# Patient Record
Sex: FEMALE | Race: BLACK OR AFRICAN AMERICAN | NOT HISPANIC OR LATINO | Employment: FULL TIME | URBAN - METROPOLITAN AREA
[De-identification: names, ages, dates, MRNs, and addresses within clinical notes are randomized per-mention and may not be internally consistent; named-entity substitution may affect disease eponyms.]

---

## 2019-08-16 ENCOUNTER — HOSPITAL ENCOUNTER (INPATIENT)
Facility: HOSPITAL | Age: 24
LOS: 3 days | Discharge: HOME | DRG: 776 | End: 2019-08-19
Attending: EMERGENCY MEDICINE | Admitting: OBSTETRICS & GYNECOLOGY
Payer: MEDICAID

## 2019-08-16 DIAGNOSIS — Z79.4 TYPE 2 DIABETES MELLITUS WITH HYPERGLYCEMIA, WITH LONG-TERM CURRENT USE OF INSULIN: Primary | ICD-10-CM

## 2019-08-16 DIAGNOSIS — N72 CERVICITIS: ICD-10-CM

## 2019-08-16 DIAGNOSIS — E11.65 TYPE 2 DIABETES MELLITUS WITH HYPERGLYCEMIA, WITH LONG-TERM CURRENT USE OF INSULIN: Primary | ICD-10-CM

## 2019-08-16 DIAGNOSIS — O99.810 HYPERGLYCEMIA IN PREGNANCY: ICD-10-CM

## 2019-08-16 LAB
ALBUMIN SERPL-MCNC: 3.1 G/DL (ref 3.3–5.5)
ALP SERPL-CCNC: 70 U/L (ref 42–141)
B-HCG UR QL: POSITIVE
BACTERIA #/AREA URNS HPF: ABNORMAL /HPF
BILIRUB SERPL-MCNC: 0.4 MG/DL (ref 0.2–1.6)
BILIRUB UR QL STRIP: NEGATIVE
BILIRUBIN, POC UA: NEGATIVE
BLOOD, POC UA: NEGATIVE
BUN SERPL-MCNC: 6 MG/DL (ref 7–22)
CALCIUM SERPL-MCNC: 8.9 MG/DL (ref 8–10.3)
CHLORIDE SERPL-SCNC: 104 MMOL/L (ref 98–108)
CLARITY UR: ABNORMAL
CLARITY, POC UA: CLEAR
COLOR UR: YELLOW
COLOR, POC UA: YELLOW
CREAT SERPL-MCNC: 0.6 MG/DL (ref 0.6–1.2)
CTP QC/QA: YES
GLUCOSE SERPL-MCNC: 286 MG/DL (ref 73–118)
GLUCOSE UR QL STRIP: ABNORMAL
GLUCOSE, POC UA: ABNORMAL
HGB UR QL STRIP: NEGATIVE
KETONES UR QL STRIP: ABNORMAL
KETONES, POC UA: ABNORMAL
LEUKOCYTE EST, POC UA: NEGATIVE
LEUKOCYTE ESTERASE UR QL STRIP: ABNORMAL
MICROSCOPIC COMMENT: ABNORMAL
NITRITE UR QL STRIP: NEGATIVE
NITRITE, POC UA: NEGATIVE
PH UR STRIP: 6 [PH]
PH UR STRIP: 6 [PH] (ref 5–8)
POC ALT (SGPT): 17 U/L (ref 10–47)
POC AST (SGOT): 16 U/L (ref 11–38)
POC TCO2: 23 MMOL/L (ref 18–33)
POCT GLUCOSE: 198 MG/DL (ref 70–110)
POCT GLUCOSE: 221 MG/DL (ref 70–110)
POCT GLUCOSE: 294 MG/DL (ref 70–110)
POTASSIUM BLD-SCNC: 3.7 MMOL/L (ref 3.6–5.1)
PROT UR QL STRIP: NEGATIVE
PROTEIN, POC UA: NEGATIVE
PROTEIN, POC: 7 G/DL (ref 6.4–8.1)
SODIUM BLD-SCNC: 134 MMOL/L (ref 128–145)
SP GR UR STRIP: >1.03 (ref 1–1.03)
SPECIFIC GRAVITY, POC UA: 1.02
SQUAMOUS #/AREA URNS HPF: 10 /HPF
URN SPEC COLLECT METH UR: ABNORMAL
UROBILINOGEN UR STRIP-ACNC: NEGATIVE EU/DL
UROBILINOGEN, POC UA: 0.2 E.U./DL
WBC #/AREA URNS HPF: 10 /HPF (ref 0–5)
WBC CLUMPS URNS QL MICRO: ABNORMAL
YEAST URNS QL MICRO: ABNORMAL

## 2019-08-16 PROCEDURE — 96360 HYDRATION IV INFUSION INIT: CPT | Mod: ER

## 2019-08-16 PROCEDURE — 63600175 PHARM REV CODE 636 W HCPCS: Performed by: OBSTETRICS & GYNECOLOGY

## 2019-08-16 PROCEDURE — 85025 COMPLETE CBC W/AUTO DIFF WBC: CPT | Mod: ER

## 2019-08-16 PROCEDURE — 81025 URINE PREGNANCY TEST: CPT | Mod: ER | Performed by: EMERGENCY MEDICINE

## 2019-08-16 PROCEDURE — 87661 TRICHOMONAS VAGINALIS AMPLIF: CPT

## 2019-08-16 PROCEDURE — 81000 URINALYSIS NONAUTO W/SCOPE: CPT

## 2019-08-16 PROCEDURE — 11000001 HC ACUTE MED/SURG PRIVATE ROOM

## 2019-08-16 PROCEDURE — 99285 EMERGENCY DEPT VISIT HI MDM: CPT | Mod: 25,ER

## 2019-08-16 PROCEDURE — 96361 HYDRATE IV INFUSION ADD-ON: CPT

## 2019-08-16 PROCEDURE — 25000003 PHARM REV CODE 250: Mod: ER | Performed by: PHYSICIAN ASSISTANT

## 2019-08-16 PROCEDURE — 96372 THER/PROPH/DIAG INJ SC/IM: CPT | Mod: 59

## 2019-08-16 PROCEDURE — 87491 CHLMYD TRACH DNA AMP PROBE: CPT

## 2019-08-16 PROCEDURE — 80053 COMPREHEN METABOLIC PANEL: CPT | Mod: ER

## 2019-08-16 PROCEDURE — 63600175 PHARM REV CODE 636 W HCPCS: Mod: ER | Performed by: PHYSICIAN ASSISTANT

## 2019-08-16 PROCEDURE — G0378 HOSPITAL OBSERVATION PER HR: HCPCS

## 2019-08-16 PROCEDURE — 81003 URINALYSIS AUTO W/O SCOPE: CPT | Mod: ER

## 2019-08-16 PROCEDURE — 87481 CANDIDA DNA AMP PROBE: CPT | Mod: 59

## 2019-08-16 RX ORDER — ACETAMINOPHEN 500 MG
500 TABLET ORAL EVERY 6 HOURS PRN
Status: DISCONTINUED | OUTPATIENT
Start: 2019-08-16 | End: 2019-08-17

## 2019-08-16 RX ORDER — AZITHROMYCIN 250 MG/1
1000 TABLET, FILM COATED ORAL
Status: COMPLETED | OUTPATIENT
Start: 2019-08-16 | End: 2019-08-16

## 2019-08-16 RX ORDER — SODIUM CHLORIDE 9 MG/ML
20 INJECTION, SOLUTION INTRAVENOUS CONTINUOUS
Status: DISCONTINUED | OUTPATIENT
Start: 2019-08-16 | End: 2019-08-17

## 2019-08-16 RX ORDER — CEFTRIAXONE 250 MG/1
250 INJECTION, POWDER, FOR SOLUTION INTRAMUSCULAR; INTRAVENOUS
Status: COMPLETED | OUTPATIENT
Start: 2019-08-16 | End: 2019-08-16

## 2019-08-16 RX ORDER — ONDANSETRON 8 MG/1
8 TABLET, ORALLY DISINTEGRATING ORAL EVERY 8 HOURS PRN
Status: DISCONTINUED | OUTPATIENT
Start: 2019-08-16 | End: 2019-08-19 | Stop reason: HOSPADM

## 2019-08-16 RX ADMIN — AZITHROMYCIN MONOHYDRATE 1000 MG: 250 TABLET ORAL at 06:08

## 2019-08-16 RX ADMIN — SODIUM CHLORIDE 1000 ML: 0.9 INJECTION, SOLUTION INTRAVENOUS at 04:08

## 2019-08-16 RX ADMIN — SODIUM CHLORIDE 1000 ML: 0.9 INJECTION, SOLUTION INTRAVENOUS at 09:08

## 2019-08-16 RX ADMIN — CEFTRIAXONE SODIUM 250 MG: 250 INJECTION, POWDER, FOR SOLUTION INTRAMUSCULAR; INTRAVENOUS at 06:08

## 2019-08-16 NOTE — ED PROVIDER NOTES
Encounter Date: 2019    SCRIBE #1 NOTE: I, Melnaie Gomez , am scribing for, and in the presence of,  RISSA Linn. I have scribed the following portions of the note - Other sections scribed: HPI.       History     Chief Complaint   Patient presents with    Vaginal Discharge     reports greenish discharge, onset approx 2 weeks     CC: Vaginal Discharge    HPI:   Marissa Lewis is a 24 y.o.  female approximately 14 weeks gravid based on ultrasound confirming IUP by Dr. Ronni Martinez (OBGYN) who presents to the ED complaining of green vaginal discharge beginning 2 weeks ago. Patient notes dysuria that began this morning, but denies hematuria, abdominal, pelvic and vaginal pain, and vaginal bleeding. She also denies fever, any URI symptoms and N/V/D. She denies taking any medications for symptoms PTA. She reports she is currently having unprotected sex and is concerned about possible STI exposure. No known exposure. Pt has family history of DM in father. Unsure of age of diagnosis but reports likely in 30s. No prenatal labs have been drawn yet with her OBGYN. Pt has not seen primary care in some time, but denies any medical history.     The history is provided by the patient.     Review of patient's allergies indicates:  No Known Allergies  History reviewed. No pertinent past medical history.  History reviewed. No pertinent surgical history.  History reviewed. No pertinent family history.  Social History     Tobacco Use    Smoking status: Never Smoker   Substance Use Topics    Alcohol use: Never     Frequency: Never    Drug use: Not on file     Review of Systems   Constitutional: Negative for chills and fever.   HENT: Negative for congestion, ear pain, rhinorrhea and sore throat.    Eyes: Negative for redness.   Respiratory: Negative for stridor.    Gastrointestinal: Negative for abdominal pain, diarrhea, nausea and vomiting.   Genitourinary: Positive for dysuria and vaginal discharge. Negative  for frequency, hematuria, pelvic pain, urgency and vaginal bleeding.   Musculoskeletal: Negative for back pain and neck pain.   Neurological: Negative for speech difficulty.   Psychiatric/Behavioral: Negative for confusion.       Physical Exam     Initial Vitals [08/16/19 1529]   BP Pulse Resp Temp SpO2   137/86 108 19 99.2 °F (37.3 °C) 100 %      MAP       --         Physical Exam    Nursing note and vitals reviewed.  Constitutional: She appears well-developed and well-nourished.   HENT:   Head: Normocephalic.   Right Ear: External ear normal.   Left Ear: External ear normal.   Nose: Nose normal.   Mouth/Throat: Oropharynx is clear and moist.   Eyes: Conjunctivae are normal.   Cardiovascular: Regular rhythm. Tachycardia present.  Exam reveals no gallop and no friction rub.    No murmur heard.  Pulmonary/Chest: Breath sounds normal. She has no wheezes. She has no rhonchi. She has no rales.   Abdominal: Soft. Bowel sounds are normal. She exhibits no distension. There is no tenderness. There is no rebound, no guarding, no tenderness at McBurney's point and negative Bahena's sign.   Genitourinary:   Genitourinary Comments: Chaperoned by Denise Luther RN:  Scant amount of thin yellow discharge from cervical os with no cervical friability. Some thick white discharge in vaginal vault. No CMT or adnexal ttp or masses. No bleeding. Os closed.    Musculoskeletal: Normal range of motion.   Lymphadenopathy:     She has no cervical adenopathy.   Neurological: She is alert. She has normal strength. No cranial nerve deficit or sensory deficit.   Skin: Skin is warm and dry.   Psychiatric: She has a normal mood and affect.         ED Course   Procedures  Labs Reviewed   POCT URINE PREGNANCY - Abnormal; Notable for the following components:       Result Value    POC Preg Test, Ur Positive (*)     All other components within normal limits   POCT URINALYSIS W/O SCOPE - Abnormal; Notable for the following components:     Glucose, UA 3+ (*)     Bilirubin, UA Negative (*)     Ketones, UA Trace (*)     Blood, UA Negative (*)     Protein, UA Negative (*)     Nitrite, UA Negative (*)     Leukocytes, UA Negative (*)     All other components within normal limits   POCT GLUCOSE - Abnormal; Notable for the following components:    POCT Glucose 294 (*)     All other components within normal limits   POCT CMP - Abnormal; Notable for the following components:    POC BUN 6 (*)     POC Glucose 286 (*)     All other components within normal limits   POCT GLUCOSE - Abnormal; Notable for the following components:    POCT Glucose 221 (*)     All other components within normal limits   VAGINOSIS SCREEN BY DNA PROBE   C. TRACHOMATIS/N. GONORRHOEAE BY AMP DNA   CBC W/ AUTO DIFFERENTIAL   COMPREHENSIVE METABOLIC PANEL   URINALYSIS, REFLEX TO URINE CULTURE   POCT CBC   POCT GLUCOSE MONITORING CONTINUOUS   POCT CMP   POCT GLUCOSE MONITORING CONTINUOUS          Imaging Results    None          Medical Decision Making:   Initial Assessment:   24-year-old female  14 weeks gravid (OBGYN Dr. Martinez) presenting for evaluation of 2 history of agreed vaginal discharge and dysuria that began this morning as well as urinary frequency throughout pregnancy.  Patient is afebrile, mildly tachycardic in no distress. She denies any abdominal pain, pelvic pain, vaginal bleeding.  Exam above.  UA with 3+ glucose.  Glucose ordered and 294. Pt states she ate a snickers bar prior to arrival. IV saline given and glucose 286 on the CMP.  No anion gap.  Considered but doubt DKA.  Abdomen soft nontender. Doubt acute abdomen.  No evidence of PID no bleeding on pelvic exam. Repeat glucose 221    Spoke with Dr. Yesenia Bhat [facility] who recommends transfer to [facility] postpartum unit for observation and glucose control.   Patient prefers to go by private vehicle and will sign AMA form against EMS transport.     Discussed pt with Dr. Templeton who agrees with assessment  and plan.   Clinical Tests:   Lab Tests: Ordered and Reviewed            Scribe Attestation:   Scribe #1: I performed the above scribed service and the documentation accurately describes the services I performed. I attest to the accuracy of the note.    Attending Attestation:   Physician Attestation Statement for Resident:  As the supervising MD   Physician Attestation Statement: I have personally seen and examined this patient.   I agree with the above history. -:   As the supervising MD I agree with the above PE.   -: The patient was seen by a mid-level provider while I was on shift in the emergency department and I was available for consultation.  I did personally examine the patient and the following pertient physical exam finding were noted:  Skin warm pink and dry, Cardiac exam was regular rate and rhythm, Lung exam clear and unlabored, Abdomen soft and nontender and The patient is alert and oriented, respirations are unlabored, regular rate and rhythm and moving all extremities. I have reviewed the midlevel documentation, medical decision making, and treatment plan documented by the mid-level provider.     Pt alert, nontoxic  RRR  CTA  Soft NT  POC gluc now 221.               Physician Attestation for Scribe:  Physician Attestation Statement for Scribe #1: I, Misa Guardado PA-C, reviewed documentation, as scribed by Melanie Gomez in my presence, and it is both accurate and complete.                     Clinical Impression:     1. Hyperglycemia in pregnancy    2. Cervicitis                                   Misa Guardado PA-C  08/16/19 1818       Lesley Templeton MD  08/16/19 1821

## 2019-08-17 LAB
ALBUMIN SERPL BCP-MCNC: 3.2 G/DL (ref 3.5–5.2)
ALP SERPL-CCNC: 51 U/L (ref 55–135)
ALT SERPL W/O P-5'-P-CCNC: 11 U/L (ref 10–44)
ANION GAP SERPL CALC-SCNC: 6 MMOL/L (ref 8–16)
AST SERPL-CCNC: 7 U/L (ref 10–40)
BASOPHILS # BLD AUTO: 0.02 K/UL (ref 0–0.2)
BASOPHILS NFR BLD: 0.2 % (ref 0–1.9)
BILIRUB SERPL-MCNC: 0.2 MG/DL (ref 0.1–1)
BUN SERPL-MCNC: 8 MG/DL (ref 6–20)
CALCIUM SERPL-MCNC: 8.8 MG/DL (ref 8.7–10.5)
CHLORIDE SERPL-SCNC: 106 MMOL/L (ref 95–110)
CO2 SERPL-SCNC: 22 MMOL/L (ref 23–29)
CREAT SERPL-MCNC: 0.7 MG/DL (ref 0.5–1.4)
DIFFERENTIAL METHOD: NORMAL
EOSINOPHIL # BLD AUTO: 0.2 K/UL (ref 0–0.5)
EOSINOPHIL NFR BLD: 1.9 % (ref 0–8)
ERYTHROCYTE [DISTWIDTH] IN BLOOD BY AUTOMATED COUNT: 14.2 % (ref 11.5–14.5)
EST. GFR  (AFRICAN AMERICAN): >60 ML/MIN/1.73 M^2
EST. GFR  (NON AFRICAN AMERICAN): >60 ML/MIN/1.73 M^2
ESTIMATED AVG GLUCOSE: 226 MG/DL (ref 68–131)
GLUCOSE SERPL-MCNC: 157 MG/DL (ref 70–110)
HBA1C MFR BLD HPLC: 9.5 % (ref 4–5.6)
HCT VFR BLD AUTO: 37.2 % (ref 37–48.5)
HGB BLD-MCNC: 12 G/DL (ref 12–16)
LYMPHOCYTES # BLD AUTO: 3.4 K/UL (ref 1–4.8)
LYMPHOCYTES NFR BLD: 32.7 % (ref 18–48)
MCH RBC QN AUTO: 27.5 PG (ref 27–31)
MCHC RBC AUTO-ENTMCNC: 32.3 G/DL (ref 32–36)
MCV RBC AUTO: 85 FL (ref 82–98)
MONOCYTES # BLD AUTO: 0.6 K/UL (ref 0.3–1)
MONOCYTES NFR BLD: 5.4 % (ref 4–15)
NEUTROPHILS # BLD AUTO: 6.2 K/UL (ref 1.8–7.7)
NEUTROPHILS NFR BLD: 59.8 % (ref 38–73)
PLATELET # BLD AUTO: 227 K/UL (ref 150–350)
PMV BLD AUTO: 9.8 FL (ref 9.2–12.9)
POCT GLUCOSE: 126 MG/DL (ref 70–110)
POCT GLUCOSE: 139 MG/DL (ref 70–110)
POCT GLUCOSE: 145 MG/DL (ref 70–110)
POCT GLUCOSE: 165 MG/DL (ref 70–110)
POCT GLUCOSE: 189 MG/DL (ref 70–110)
POCT GLUCOSE: 220 MG/DL (ref 70–110)
POTASSIUM SERPL-SCNC: 3.7 MMOL/L (ref 3.5–5.1)
PROT SERPL-MCNC: 6.3 G/DL (ref 6–8.4)
RBC # BLD AUTO: 4.37 M/UL (ref 4–5.4)
SODIUM SERPL-SCNC: 134 MMOL/L (ref 136–145)
WBC # BLD AUTO: 10.47 K/UL (ref 3.9–12.7)

## 2019-08-17 PROCEDURE — 36415 COLL VENOUS BLD VENIPUNCTURE: CPT

## 2019-08-17 PROCEDURE — 83036 HEMOGLOBIN GLYCOSYLATED A1C: CPT

## 2019-08-17 PROCEDURE — 96372 THER/PROPH/DIAG INJ SC/IM: CPT

## 2019-08-17 PROCEDURE — 80053 COMPREHEN METABOLIC PANEL: CPT

## 2019-08-17 PROCEDURE — 25000003 PHARM REV CODE 250: Performed by: OBSTETRICS & GYNECOLOGY

## 2019-08-17 PROCEDURE — 96361 HYDRATE IV INFUSION ADD-ON: CPT

## 2019-08-17 PROCEDURE — 63600175 PHARM REV CODE 636 W HCPCS: Performed by: OBSTETRICS & GYNECOLOGY

## 2019-08-17 PROCEDURE — 85025 COMPLETE CBC W/AUTO DIFF WBC: CPT

## 2019-08-17 PROCEDURE — 63600175 PHARM REV CODE 636 W HCPCS: Performed by: HOSPITALIST

## 2019-08-17 PROCEDURE — 11000001 HC ACUTE MED/SURG PRIVATE ROOM

## 2019-08-17 RX ORDER — GLUCAGON 1 MG
1 KIT INJECTION
Status: DISCONTINUED | OUTPATIENT
Start: 2019-08-17 | End: 2019-08-19 | Stop reason: HOSPADM

## 2019-08-17 RX ORDER — ACETAMINOPHEN 500 MG
500 TABLET ORAL EVERY 6 HOURS PRN
Status: DISCONTINUED | OUTPATIENT
Start: 2019-08-17 | End: 2019-08-19 | Stop reason: HOSPADM

## 2019-08-17 RX ORDER — IBUPROFEN 200 MG
24 TABLET ORAL
Status: DISCONTINUED | OUTPATIENT
Start: 2019-08-17 | End: 2019-08-19 | Stop reason: HOSPADM

## 2019-08-17 RX ORDER — IBUPROFEN 200 MG
16 TABLET ORAL
Status: DISCONTINUED | OUTPATIENT
Start: 2019-08-17 | End: 2019-08-19 | Stop reason: HOSPADM

## 2019-08-17 RX ORDER — INSULIN ASPART 100 [IU]/ML
1-10 INJECTION, SOLUTION INTRAVENOUS; SUBCUTANEOUS
Status: DISCONTINUED | OUTPATIENT
Start: 2019-08-17 | End: 2019-08-19 | Stop reason: HOSPADM

## 2019-08-17 RX ADMIN — SODIUM CHLORIDE 1000 ML: 0.9 INJECTION, SOLUTION INTRAVENOUS at 04:08

## 2019-08-17 RX ADMIN — INSULIN ASPART 4 UNITS: 100 INJECTION, SOLUTION INTRAVENOUS; SUBCUTANEOUS at 02:08

## 2019-08-17 RX ADMIN — ACETAMINOPHEN 500 MG: 500 TABLET, FILM COATED ORAL at 01:08

## 2019-08-17 NOTE — PROGRESS NOTES
Insulin given in right arm per sliding scale. Patient instructed on use of insulin pen and injection techniques. Patient verbalizes understanding with good recall. FHT's detected in lower middle abdomen at 135-145 BPM.

## 2019-08-17 NOTE — CONSULTS
Food & Nutrition  Education    Diet Education: DM/Gestational DM and Nutrition  Time Spent: 15 min  Learners: Patient      Nutrition Education provided with handouts:   1. DM overview  2. Label reading tips      Comments: Pt receptive to education, reports being motivated to make changes. Encouraged limiting/eliminating simple sugars and limiting portion sizes of CHO rich foods to ~ 4 per meal, 1-2 per snack. Reviewed basics of label reading and guidelines per meal/snack. Discussed overall healthy eating during pregnancy and need for prenatal MVI and balanced diet with calcium rich foods included.  Provided f/u resources for outpatient f/u.       All questions and concerns answered. Dietitian's contact information provided.       Please Re-consult as needed        Thanks!  Caryn Beal RD

## 2019-08-17 NOTE — CONSULTS
[facility] Hospital Medicine  Consult Note    Patient Name: Marissa Lewis  MRN: [id number]  Admission Date: 8/16/2019  Hospital Length of Stay: 0 days  Attending Physician:Perlita  Primary Care Provider: To Obtain Unable           Patient information was obtained from patient and past medical records.     Consults  Subjective:     Principal Problem: <principal problem not specified>    Chief Complaint:   Chief Complaint   Patient presents with    Vaginal Discharge     reports greenish discharge, onset approx 2 weeks        HPI: See H&P    History reviewed. No pertinent past medical history.    History reviewed. No pertinent surgical history.    Review of patient's allergies indicates:  No Known Allergies    No current facility-administered medications on file prior to encounter.      No current outpatient medications on file prior to encounter.     Family History     None        Tobacco Use    Smoking status: Never Smoker   Substance and Sexual Activity    Alcohol use: Never     Frequency: Never    Drug use: Not on file    Sexual activity: Not on file     Review of Systems   Constitutional: Negative for activity change and appetite change.   HENT: Negative for congestion.    Eyes: Negative for itching.   Respiratory: Negative for apnea and chest tightness.    Cardiovascular: Negative for leg swelling.   Gastrointestinal: Negative for abdominal pain.   Endocrine: Negative for cold intolerance and heat intolerance.   Genitourinary: Negative for difficulty urinating and dysuria.   Musculoskeletal: Negative for arthralgias and back pain.   Skin: Negative for color change.   Allergic/Immunologic: Negative for environmental allergies and food allergies.   Neurological: Negative for headaches.   Hematological: Does not bruise/bleed easily.   Psychiatric/Behavioral: Negative for agitation and behavioral problems.     Objective:     Vital Signs (Most Recent):  Temp: 98.2 °F (36.8 °C) (08/17/19  1114)  Pulse: 87 (08/17/19 1114)  Resp: 20 (08/17/19 1114)  BP: (!) 109/55 (08/17/19 1114)  SpO2: 98 % (08/17/19 1114) Vital Signs (24h Range):  Temp:  [97.8 °F (36.6 °C)-99.5 °F (37.5 °C)] 98.2 °F (36.8 °C)  Pulse:  [] 87  Resp:  [16-20] 20  SpO2:  [97 %-100 %] 98 %  BP: (104-137)/(55-92) 109/55     Weight: 119.3 kg (263 lb)  Body mass index is 41.19 kg/m².    Physical Exam   Constitutional: She is oriented to person, place, and time. No distress.   Eyes: EOM are normal.   Neck: Neck supple.   Cardiovascular: Normal rate and regular rhythm.   Pulmonary/Chest: Effort normal.   Abdominal: Soft. Bowel sounds are normal.   Musculoskeletal: Normal range of motion. She exhibits no edema.   Neurological: She is oriented to person, place, and time. Coordination normal.   Skin: Skin is warm.   Psychiatric: She has a normal mood and affect. Thought content normal.       Significant Labs:   BMP:   Recent Labs   Lab 08/17/19  0513   *   *   K 3.7      CO2 22*   BUN 8   CREATININE 0.7   CALCIUM 8.8     CBC:   Recent Labs   Lab 08/17/19  0513   WBC 10.47   HGB 12.0   HCT 37.2              Assessment/Plan:     Hyperglycemia in pregnancy  Hospital medicice has been consulted for DM management for pregnant patient,fasting sugar today is only 157 in AM labs,,started on SSI,HbA1C is pending,        VTE Risk Mitigation (From admission, onward)    None              Thank you for your consult. I will follow-up with patient. Please contact us if you have any additional questions.    Geri Trevizo MD  Department of Hospital Medicine   [facility]

## 2019-08-17 NOTE — ASSESSMENT & PLAN NOTE
Hospital medicice has been consulted for DM management for pregnant patient,fasting sugar today is only 157 in AM labs,,started on SSI,HbA1C is pending,

## 2019-08-17 NOTE — H&P
`History and Physical  Obstetrics      SUBJECTIVE:     Marissa Lewis is a 24 y.o.  female at 14w4d  admitted for diabetic management.  Her current obstetrical history is significant for dm.      No medications prior to admission.       Review of patient's allergies indicates:  No Known Allergies     History reviewed. No pertinent past medical history.  History reviewed. No pertinent surgical history.  History reviewed. No pertinent family history.  Social History     Tobacco Use    Smoking status: Never Smoker   Substance Use Topics    Alcohol use: Never     Frequency: Never    Drug use: Not on file        OBJECTIVE:     Vital Signs (Most Recent)  Temp: 98.6 °F (37 °C) (19)  Pulse: 87 (19)  Resp: 20 (19)  BP: (!) 104/59 (19)  SpO2: 98 % (19)    Physical Exam:  General:  Normal, alert and oriented                       Abdomen: Soft gravid no FT   Uterine Size:  c/w dates       FHT: reviewed   Pelvis: NEFG                                  Laboratory:  No results for input(s): ABORH, HEPBSAG, RUBELLAIGGSC, GBS, AFP, BGECPZC4BI in the last 168 hours.   ASSESSMENT/PLAN:     14w4d gestation.   Conditions: Diabetes: Pre-gestational   Will consult hospitalist to start insulin dosing

## 2019-08-17 NOTE — PLAN OF CARE
Problem: Adult Inpatient Plan of Care  Goal: Patient-Specific Goal (Individualization)  Outcome: Ongoing (interventions implemented as appropriate)  VSS. Denies any pain. No vaginal bleeding. Glucose monitoring per order. LH IV patent and infusing with NS at 125ml/hr. POC reviewed and understanding noted. ALIS

## 2019-08-17 NOTE — HOSPITAL COURSE
Hospital medicice has been consulted for DM management for pregnant patient,fasting sugar today is only 157,started on SSI,HbA1C is 9.5,started on SSI.blood sugar is stable.  Patient can go home with insulin,I think choice of insulin in pregnancy is NPH insulin,may 5 bid with close monitoring and diet.  Will sign off.

## 2019-08-17 NOTE — PLAN OF CARE
"Problem: Adult Inpatient Plan of Care  Goal: Plan of Care Review  Outcome: Ongoing (interventions implemented as appropriate)  Patient in stable condition. Sliding scale initiated and patient instructed on pen usage and injection technique. Reinforced teaching and literature given on compliance with diabetic diet. Patient verbalizes understanding of all instructions with good recall. FHT"S auscultated via doppler, rate WNL.       "

## 2019-08-17 NOTE — SUBJECTIVE & OBJECTIVE
History reviewed. No pertinent past medical history.    History reviewed. No pertinent surgical history.    Review of patient's allergies indicates:  No Known Allergies    No current facility-administered medications on file prior to encounter.      No current outpatient medications on file prior to encounter.     Family History     None        Tobacco Use    Smoking status: Never Smoker   Substance and Sexual Activity    Alcohol use: Never     Frequency: Never    Drug use: Not on file    Sexual activity: Not on file     Review of Systems   Constitutional: Negative for activity change and appetite change.   HENT: Negative for congestion.    Eyes: Negative for itching.   Respiratory: Negative for apnea and chest tightness.    Cardiovascular: Negative for leg swelling.   Gastrointestinal: Negative for abdominal pain.   Endocrine: Negative for cold intolerance and heat intolerance.   Genitourinary: Negative for difficulty urinating and dysuria.   Musculoskeletal: Negative for arthralgias and back pain.   Skin: Negative for color change.   Allergic/Immunologic: Negative for environmental allergies and food allergies.   Neurological: Negative for headaches.   Hematological: Does not bruise/bleed easily.   Psychiatric/Behavioral: Negative for agitation and behavioral problems.     Objective:     Vital Signs (Most Recent):  Temp: 98.2 °F (36.8 °C) (08/17/19 1114)  Pulse: 87 (08/17/19 1114)  Resp: 20 (08/17/19 1114)  BP: (!) 109/55 (08/17/19 1114)  SpO2: 98 % (08/17/19 1114) Vital Signs (24h Range):  Temp:  [97.8 °F (36.6 °C)-99.5 °F (37.5 °C)] 98.2 °F (36.8 °C)  Pulse:  [] 87  Resp:  [16-20] 20  SpO2:  [97 %-100 %] 98 %  BP: (104-137)/(55-92) 109/55     Weight: 119.3 kg (263 lb)  Body mass index is 41.19 kg/m².    Physical Exam   Constitutional: She is oriented to person, place, and time. No distress.   Eyes: EOM are normal.   Neck: Neck supple.   Cardiovascular: Normal rate and regular rhythm.   Pulmonary/Chest:  Effort normal.   Abdominal: Soft. Bowel sounds are normal.   Musculoskeletal: Normal range of motion. She exhibits no edema.   Neurological: She is oriented to person, place, and time. Coordination normal.   Skin: Skin is warm.   Psychiatric: She has a normal mood and affect. Thought content normal.       Significant Labs:   BMP:   Recent Labs   Lab 08/17/19 0513   *   *   K 3.7      CO2 22*   BUN 8   CREATININE 0.7   CALCIUM 8.8     CBC:   Recent Labs   Lab 08/17/19 0513   WBC 10.47   HGB 12.0   HCT 37.2

## 2019-08-18 PROBLEM — E11.65 TYPE 2 DIABETES MELLITUS WITH HYPERGLYCEMIA: Status: ACTIVE | Noted: 2019-08-18

## 2019-08-18 LAB
POCT GLUCOSE: 115 MG/DL (ref 70–110)
POCT GLUCOSE: 123 MG/DL (ref 70–110)
POCT GLUCOSE: 141 MG/DL (ref 70–110)
POCT GLUCOSE: 171 MG/DL (ref 70–110)

## 2019-08-18 PROCEDURE — 11000001 HC ACUTE MED/SURG PRIVATE ROOM

## 2019-08-18 PROCEDURE — 63600175 PHARM REV CODE 636 W HCPCS: Performed by: OBSTETRICS & GYNECOLOGY

## 2019-08-18 RX ADMIN — INSULIN ASPART 2 UNITS: 100 INJECTION, SOLUTION INTRAVENOUS; SUBCUTANEOUS at 12:08

## 2019-08-18 RX ADMIN — HUMAN INSULIN 5 UNITS: 100 INJECTION, SUSPENSION SUBCUTANEOUS at 05:08

## 2019-08-18 NOTE — SUBJECTIVE & OBJECTIVE
History reviewed. No pertinent past medical history.    History reviewed. No pertinent surgical history.    Review of patient's allergies indicates:  No Known Allergies    No current facility-administered medications on file prior to encounter.      No current outpatient medications on file prior to encounter.     Family History     None        Tobacco Use    Smoking status: Never Smoker   Substance and Sexual Activity    Alcohol use: Never     Frequency: Never    Drug use: Not on file    Sexual activity: Not on file     Review of Systems   Constitutional: Negative for activity change and appetite change.   HENT: Negative for congestion.    Eyes: Negative for itching.   Respiratory: Negative for apnea and chest tightness.    Cardiovascular: Negative for leg swelling.   Gastrointestinal: Negative for abdominal pain.   Endocrine: Negative for cold intolerance and heat intolerance.   Genitourinary: Negative for difficulty urinating and dysuria.   Musculoskeletal: Negative for arthralgias and back pain.   Skin: Negative for color change.   Allergic/Immunologic: Negative for environmental allergies and food allergies.   Neurological: Negative for headaches.   Hematological: Does not bruise/bleed easily.   Psychiatric/Behavioral: Negative for agitation and behavioral problems.     Objective:     Vital Signs (Most Recent):  Temp: 98 °F (36.7 °C) (08/18/19 0800)  Pulse: 88 (08/18/19 0800)  Resp: 18 (08/18/19 0800)  BP: (!) 105/55 (08/18/19 0800)  SpO2: 97 % (08/17/19 1609) Vital Signs (24h Range):  Temp:  [96.7 °F (35.9 °C)-98.9 °F (37.2 °C)] 98 °F (36.7 °C)  Pulse:  [79-90] 88  Resp:  [16-20] 18  SpO2:  [97 %-98 %] 97 %  BP: (105-122)/(55-72) 105/55     Weight: 119.3 kg (263 lb)  Body mass index is 41.19 kg/m².    Physical Exam   Constitutional: She is oriented to person, place, and time. No distress.   Eyes: EOM are normal.   Neck: Neck supple.   Cardiovascular: Normal rate and regular rhythm.   Pulmonary/Chest:  Effort normal.   Abdominal: Soft. Bowel sounds are normal.   Musculoskeletal: Normal range of motion. She exhibits no edema.   Neurological: She is oriented to person, place, and time. Coordination normal.   Skin: Skin is warm.   Psychiatric: She has a normal mood and affect. Thought content normal.       Significant Labs:   BMP:   Recent Labs   Lab 08/17/19 0513   *   *   K 3.7      CO2 22*   BUN 8   CREATININE 0.7   CALCIUM 8.8     CBC:   Recent Labs   Lab 08/17/19 0513   WBC 10.47   HGB 12.0   HCT 37.2

## 2019-08-18 NOTE — ASSESSMENT & PLAN NOTE
Hospital medicice has been consulted for DM management for pregnant patient,fasting sugar today is only 115  in AM labs,,started on SSI,HbA1C 9.5.

## 2019-08-18 NOTE — PROGRESS NOTES
[facility]  Obstetrics & Gynecology  Progress Note    Patient Name: Marissa Lewis  MRN: [id number]  Admission Date: 8/16/2019  Primary Care Provider: To Obtain Unable  Principal Problem: Hyperglycemia in pregnancy    Subjective:     Interval History: 25 y/o female with pregestational DM.  No c/o    Scheduled Meds:   insulin NPH  5 Units Subcutaneous BID AC     Continuous Infusions:  PRN Meds:acetaminophen, dextrose 50%, dextrose 50%, glucagon (human recombinant), glucose, glucose, insulin aspart U-100, ondansetron, promethazine (PHENERGAN) IVPB    Review of patient's allergies indicates:  No Known Allergies    Objective:     Vital Signs (Most Recent):  Temp: 98.5 °F (36.9 °C) (08/18/19 1200)  Pulse: 85 (08/18/19 1200)  Resp: 18 (08/18/19 1200)  BP: (!) 105/58 (08/18/19 1200)  SpO2: 97 % (08/17/19 1609) Vital Signs (24h Range):  Temp:  [96.7 °F (35.9 °C)-98.9 °F (37.2 °C)] 98.5 °F (36.9 °C)  Pulse:  [79-90] 85  Resp:  [16-18] 18  SpO2:  [97 %] 97 %  BP: (105-122)/(55-72) 105/58     Weight: 119.3 kg (263 lb)  Body mass index is 41.19 kg/m².  No LMP recorded (approximate). Patient is pregnant.    I&O (Last 24H):    Intake/Output Summary (Last 24 hours) at 8/18/2019 1333  Last data filed at 8/18/2019 0439  Gross per 24 hour   Intake 1200 ml   Output 300 ml   Net 900 ml       Physical Exam   Gen A&O x 4 NAD  Abd soft nt  FHT documented    Laboratory:  CBC:   Recent Labs   Lab 08/17/19  0513   WBC 10.47   RBC 4.37   HGB 12.0   HCT 37.2      MCV 85   MCH 27.5   MCHC 32.3     CMP:   Recent Labs   Lab 08/17/19  0513   *   CALCIUM 8.8   ALBUMIN 3.2*   PROT 6.3   *   K 3.7   CO2 22*      BUN 8   CREATININE 0.7   ALKPHOS 51*   ALT 11   AST 7*   BILITOT 0.2       Diagnostic Results:  none    Assessment/Plan:     Active Diagnoses:    Diagnosis Date Noted POA    PRINCIPAL PROBLEM:  Hyperglycemia in pregnancy [O99.810] 08/16/2019 Yes    Type 2 diabetes mellitus with hyperglycemia  [E11.65] 08/18/2019 Yes      Problems Resolved During this Admission:       Pregestational diabetes  Start NPH 5 units BID  Appreciate hospitalist medicine    Yesenia Art MD  Obstetrics & Gynecology  [facility]

## 2019-08-18 NOTE — PLAN OF CARE
Problem: Adult Inpatient Plan of Care  Goal: Patient-Specific Goal (Individualization)  Outcome: Ongoing (interventions implemented as appropriate)  VSS. No pain. Glucose checks completed as ordered. Patient without complaints. FHTs WNL. POC discussed and understanding voiced. CECILIA

## 2019-08-18 NOTE — PROGRESS NOTES
[facility] Hospital Medicine  Progress Note    Patient Name: Marissa Lewis  MRN: [id number]  Patient Class: IP- Inpatient   Admission Date: 8/16/2019  Length of Stay: 1 days  Attending Physician: Yesenia Bhat*  Primary Care Provider: To Obtain Unable        Subjective:     Principal Problem:<principal problem not specified>        HPI:  See H&P    Overview/Hospital Course:  Hospital medicice has been consulted for DM management for pregnant patient,fasting sugar today is only 157,started on SSI,HbA1C is 9.5,started on SSI.blood sugar is stable.  Patient can go home with insulin,I think choice of insulin in pregnancy is NPH insulin,may 5 bid with close monitoring and diet.  Will sign off.    History reviewed. No pertinent past medical history.    History reviewed. No pertinent surgical history.    Review of patient's allergies indicates:  No Known Allergies    No current facility-administered medications on file prior to encounter.      No current outpatient medications on file prior to encounter.     Family History     None        Tobacco Use    Smoking status: Never Smoker   Substance and Sexual Activity    Alcohol use: Never     Frequency: Never    Drug use: Not on file    Sexual activity: Not on file     Review of Systems   Constitutional: Negative for activity change and appetite change.   HENT: Negative for congestion.    Eyes: Negative for itching.   Respiratory: Negative for apnea and chest tightness.    Cardiovascular: Negative for leg swelling.   Gastrointestinal: Negative for abdominal pain.   Endocrine: Negative for cold intolerance and heat intolerance.   Genitourinary: Negative for difficulty urinating and dysuria.   Musculoskeletal: Negative for arthralgias and back pain.   Skin: Negative for color change.   Allergic/Immunologic: Negative for environmental allergies and food allergies.   Neurological: Negative for headaches.   Hematological: Does not bruise/bleed  easily.   Psychiatric/Behavioral: Negative for agitation and behavioral problems.     Objective:     Vital Signs (Most Recent):  Temp: 98 °F (36.7 °C) (08/18/19 0800)  Pulse: 88 (08/18/19 0800)  Resp: 18 (08/18/19 0800)  BP: (!) 105/55 (08/18/19 0800)  SpO2: 97 % (08/17/19 1609) Vital Signs (24h Range):  Temp:  [96.7 °F (35.9 °C)-98.9 °F (37.2 °C)] 98 °F (36.7 °C)  Pulse:  [79-90] 88  Resp:  [16-20] 18  SpO2:  [97 %-98 %] 97 %  BP: (105-122)/(55-72) 105/55     Weight: 119.3 kg (263 lb)  Body mass index is 41.19 kg/m².    Physical Exam   Constitutional: She is oriented to person, place, and time. No distress.   Eyes: EOM are normal.   Neck: Neck supple.   Cardiovascular: Normal rate and regular rhythm.   Pulmonary/Chest: Effort normal.   Abdominal: Soft. Bowel sounds are normal.   Musculoskeletal: Normal range of motion. She exhibits no edema.   Neurological: She is oriented to person, place, and time. Coordination normal.   Skin: Skin is warm.   Psychiatric: She has a normal mood and affect. Thought content normal.       Significant Labs:   BMP:   Recent Labs   Lab 08/17/19  0513   *   *   K 3.7      CO2 22*   BUN 8   CREATININE 0.7   CALCIUM 8.8     CBC:   Recent Labs   Lab 08/17/19  0513   WBC 10.47   HGB 12.0   HCT 37.2                Assessment/Plan:      Type 2 diabetes mellitus with hyperglycemia  HbA1C is 9.5,started on SSI.blood sugar is stable.  Patient can go home with insulin,I think choice of insulin in pregnancy is NPH insulin,may 5 bid with close monitoring and diet.  Will sign off.      Hyperglycemia in pregnancy  Hospital medicice has been consulted for DM management for pregnant patient,fasting sugar today is only 115  in AM labs,,started on SSI,HbA1C 9.5.        VTE Risk Mitigation (From admission, onward)    None                Geri Trevizo MD  Department of Hospital Medicine   [facility]

## 2019-08-18 NOTE — PLAN OF CARE
Problem: Diabetes in Pregnancy  Goal: Blood Glucose Level Within Desired Range  Outcome: Ongoing (interventions implemented as appropriate)  Glucose monitoring as ordered.

## 2019-08-18 NOTE — ASSESSMENT & PLAN NOTE
HbA1C is 9.5,started on SSI.blood sugar is stable.  Patient can go home with insulin,I think choice of insulin in pregnancy is NPH insulin,may 5 bid with close monitoring and diet.  Will sign off.

## 2019-08-18 NOTE — PLAN OF CARE
Problem: Adult Inpatient Plan of Care  Goal: Plan of Care Review  Outcome: Ongoing (interventions implemented as appropriate)  POC discussed. Reviewed parameters reviewed. Encouraged ambulation in [place]iley. Understanding voiced. Whiteboard updated with Domo. CECILIA

## 2019-08-19 VITALS
OXYGEN SATURATION: 98 % | HEART RATE: 89 BPM | DIASTOLIC BLOOD PRESSURE: 57 MMHG | RESPIRATION RATE: 20 BRPM | TEMPERATURE: 98 F | HEIGHT: 67 IN | BODY MASS INDEX: 41.28 KG/M2 | WEIGHT: 263 LBS | SYSTOLIC BLOOD PRESSURE: 110 MMHG

## 2019-08-19 LAB
BACTERIAL VAGINOSIS DNA: NEGATIVE
C TRACH DNA SPEC QL NAA+PROBE: NOT DETECTED
CANDIDA GLABRATA DNA: NEGATIVE
CANDIDA KRUSEI DNA: NEGATIVE
CANDIDA RRNA VAG QL PROBE: POSITIVE
N GONORRHOEA DNA SPEC QL NAA+PROBE: NOT DETECTED
POCT GLUCOSE: 105 MG/DL (ref 70–110)
POCT GLUCOSE: 144 MG/DL (ref 70–110)
POCT GLUCOSE: 200 MG/DL (ref 70–110)
T VAGINALIS RRNA GENITAL QL PROBE: NEGATIVE

## 2019-08-19 RX ORDER — LANCETS
1 EACH MISCELLANEOUS 4 TIMES DAILY
Qty: 150 EACH | Refills: 3 | Status: SHIPPED | OUTPATIENT
Start: 2019-08-19 | End: 2019-08-29

## 2019-08-19 RX ORDER — INSULIN PUMP SYRINGE, 3 ML
EACH MISCELLANEOUS
Qty: 1 EACH | Refills: 0 | Status: SHIPPED | OUTPATIENT
Start: 2019-08-19 | End: 2019-08-29

## 2019-08-19 RX ADMIN — HUMAN INSULIN 5 UNITS: 100 INJECTION, SUSPENSION SUBCUTANEOUS at 08:08

## 2019-08-19 NOTE — PROGRESS NOTES
[facility]  Obstetrics & Gynecology  Progress Note    Patient Name: Marissa Lewis  MRN: [id number]  Admission Date: 8/16/2019  Primary Care Provider: To Obtain Unable  Principal Problem: Hyperglycemia in pregnancy    Subjective:     Interval History: 23 y/o admitted for diabetic patterning.  Patient with pre gestational DM.  Is near optimized with 5 U NPH qam and qhs.  Will increase to 8 units qam.  Patient is invited to follow up at [facility] for OB care, if she wishes.      Scheduled Meds:   insulin NPH  5 Units Subcutaneous BID AC     Continuous Infusions:  PRN Meds:acetaminophen, dextrose 50%, dextrose 50%, glucagon (human recombinant), glucose, glucose, insulin aspart U-100, ondansetron, promethazine (PHENERGAN) IVPB    Review of patient's allergies indicates:  No Known Allergies    Objective:     Vital Signs (Most Recent):  Temp: 98.4 °F (36.9 °C) (08/19/19 1105)  Pulse: 89 (08/19/19 1105)  Resp: 20 (08/19/19 1105)  BP: (!) 110/57 (08/19/19 1105)  SpO2: 98 % (08/19/19 1105) Vital Signs (24h Range):  Temp:  [98 °F (36.7 °C)-99.4 °F (37.4 °C)] 98.4 °F (36.9 °C)  Pulse:  [79-92] 89  Resp:  [17-20] 20  SpO2:  [97 %-98 %] 98 %  BP: (101-113)/(54-64) 110/57     Weight: 119.3 kg (263 lb)  Body mass index is 41.19 kg/m².  No LMP recorded (approximate). Patient is pregnant.    I&O (Last 24H):    Intake/Output Summary (Last 24 hours) at 8/19/2019 1159  Last data filed at 8/19/2019 0412  Gross per 24 hour   Intake 960 ml   Output 300 ml   Net 660 ml       Physical Exam   Gen Alert and oriented  CV RRR  Lungs CTA B  Ab soft nt  Ext no cce    Laboratory:  I have personallly reviewed all pertinent lab results from the last 24 hours.    Diagnostic Results:  Labs: Reviewed       Assessment/Plan:     Active Diagnoses:    Diagnosis Date Noted POA    PRINCIPAL PROBLEM:  Hyperglycemia in pregnancy [O99.810] 08/16/2019 Yes    Type 2 diabetes mellitus with hyperglycemia [E11.65] 08/18/2019 Yes      Problems  Resolved During this Admission:       Will discharge with NPH insulin 8 unit qam 8 units qhs    Follow up at [facility]    Micky Zhu MD  Obstetrics & Gynecology  [facility]

## 2019-08-19 NOTE — DISCHARGE INSTRUCTIONS
General Discharge Instructions  · May follow a regular diet, unless otherwise discussed with physician.  · Activity as tolerated.  · No lifting or heavy exercise  · May return to work/school as discussed with physician  · Follow MD specific instructions, follow up as needed    Report to MD:  · Labor pains that are 5-10 minutes apart and regular  · Water bag break or possible leaking  Do kick counts once a day on your baby. Choose the time of day your baby is most active. Get in a comfortable lying or sitting position and time how long it takes to feel 10 kicks, twists, turns, swishes, or rolls. Report diminished fetal movement of less than 10 movements in a 12 hour period

## 2019-08-20 NOTE — DISCHARGE SUMMARY
DATE OF ADMISSION:  08/16/2019    DATE OF DISCHARGE:  08/19/2019    ADMIT DIAGNOSES:  1.  Intrauterine pregnancy at 14 weeks' gestation.  2.  Pregestational diabetes, uncontrolled.    DISCHARGE DIAGNOSES:  1.  Intrauterine pregnancy at 14 weeks' gestation.  2.  Pregestational diabetes, uncontrolled.    HOSPITAL COURSE:  Ms. Lewis is a 24-year-old -American female who is   approximately 14 weeks' gestation, who was admitted to our service for blood   sugar patterning.  This patient has pregestational diabetes with very poor   control.  She was placed on a weight-based protocol and would ultimately require   5 units of NPH in the evenings and 8 units of NPH in the morning.  She   responded to this regimen fairly well and on the morning of 08/19/2019, she was   within acceptable range to be discharged home.  She was discharged home with a   prescription for insulin, a glucometer, test strips, and lancets.  She will   follow up with her OB/GYN next week.  She was discharged home with instructions   to follow a diabetic diet and to return to the Emergency Department for any   problems.  She was instructed to have normal activity and then to return to the   Emergency Department if she has any problems.      FROYLAN/AV  dd: 08/19/2019 12:10:30 (CDT)  td: 08/20/2019 02:21:41 (CDT)  Doc ID   #[id number]  Job ID #[id number]    CC:

## 2019-08-29 ENCOUNTER — OFFICE VISIT (OUTPATIENT)
Dept: OBSTETRICS AND GYNECOLOGY | Facility: CLINIC | Age: 24
End: 2019-08-29
Payer: MEDICAID

## 2019-08-29 VITALS
SYSTOLIC BLOOD PRESSURE: 116 MMHG | DIASTOLIC BLOOD PRESSURE: 74 MMHG | BODY MASS INDEX: 41.55 KG/M2 | WEIGHT: 264.75 LBS | HEIGHT: 67 IN

## 2019-08-29 DIAGNOSIS — O99.210 MATERNAL OBESITY AFFECTING PREGNANCY, ANTEPARTUM: ICD-10-CM

## 2019-08-29 DIAGNOSIS — Z32.00 ENCOUNTER FOR CONFIRMATION OF PREGNANCY TEST RESULT WITH PHYSICAL EXAMINATION: Primary | ICD-10-CM

## 2019-08-29 DIAGNOSIS — O24.319 PREEXISTING DIABETES COMPLICATING PREGNANCY, ANTEPARTUM: ICD-10-CM

## 2019-08-29 DIAGNOSIS — Z36.89 ENCOUNTER TO ESTABLISH GESTATIONAL AGE USING ULTRASOUND: ICD-10-CM

## 2019-08-29 PROCEDURE — 99203 PR OFFICE/OUTPT VISIT, NEW, LEVL III, 30-44 MIN: ICD-10-PCS | Mod: S$PBB,TH,, | Performed by: OBSTETRICS & GYNECOLOGY

## 2019-08-29 PROCEDURE — 99999 PR PBB SHADOW E&M-EST. PATIENT-LVL III: CPT | Mod: PBBFAC,,, | Performed by: OBSTETRICS & GYNECOLOGY

## 2019-08-29 PROCEDURE — 99203 OFFICE O/P NEW LOW 30 MIN: CPT | Mod: S$PBB,TH,, | Performed by: OBSTETRICS & GYNECOLOGY

## 2019-08-29 PROCEDURE — 99213 OFFICE O/P EST LOW 20 MIN: CPT | Mod: PBBFAC,TH | Performed by: OBSTETRICS & GYNECOLOGY

## 2019-08-29 PROCEDURE — 99999 PR PBB SHADOW E&M-EST. PATIENT-LVL III: ICD-10-PCS | Mod: PBBFAC,,, | Performed by: OBSTETRICS & GYNECOLOGY

## 2019-08-29 RX ORDER — INSULIN PUMP SYRINGE, 3 ML
EACH MISCELLANEOUS
Qty: 1 EACH | Refills: 0 | Status: SHIPPED | OUTPATIENT
Start: 2019-08-29

## 2019-08-29 RX ORDER — BLOOD-GLUCOSE CONTROL, NORMAL
1 EACH MISCELLANEOUS 4 TIMES DAILY PRN
Qty: 100 EACH | Refills: 11 | Status: SHIPPED | OUTPATIENT
Start: 2019-08-29 | End: 2020-08-28

## 2019-08-29 RX ORDER — SYRINGE,SAFETY WITH NEEDLE,1ML 25GX1"
1 SYRINGE (EA) MISCELLANEOUS
Qty: 100 EACH | Refills: 5 | Status: SHIPPED | OUTPATIENT
Start: 2019-08-29

## 2019-08-29 RX ORDER — INSULIN ASPART 100 [IU]/ML
10 INJECTION, SOLUTION INTRAVENOUS; SUBCUTANEOUS
Qty: 3 ML | Refills: 3 | Status: SHIPPED | OUTPATIENT
Start: 2019-08-29

## 2019-08-29 RX ORDER — FOLIC ACID 1 MG/1
4 TABLET ORAL DAILY
Qty: 120 TABLET | Refills: 11 | Status: SHIPPED | OUTPATIENT
Start: 2019-08-29 | End: 2020-08-28

## 2019-08-30 ENCOUNTER — TELEPHONE (OUTPATIENT)
Dept: MATERNAL FETAL MEDICINE | Facility: CLINIC | Age: 24
End: 2019-08-30

## 2019-08-30 NOTE — TELEPHONE ENCOUNTER
I called Marissa to get her scheduled for an ultrasound and her phone went straight to voicemail. I left her a message with the phone number to call us back. The second number on file rang but did not have a voicemail option and the pt does not have the portal to get a message there.     Caryn

## 2019-08-31 NOTE — PROGRESS NOTES
[facility]  Ambulatory Clinic  Obstetrics & Gynecology    Visit Date:  2019     Chief Complaint:  Missed my period    History of Present Illness:      Marissa Lewis is a 24 y.o. , UPT positive today, here with c/o missed period.    Patient's last menstrual period was 2019.      Pt has no major complaints today and denies any vaginal bleeding, discharge, pain, GI/ compliants.      Medical history is remarkable for DM, recently dx, admitted on  for glucose control and initiation of insulin therapy.    Pt states she did not  her glucose testing supplies or insulin since discharge from hospital.    Pt reports overall good health.    Past Medical History:      Diabetes on insuilin   Obesity     Past Surgical History:     History reviewed. No pertinent surgical history.     Medications:     Prenatal vitamins   Insulin     Allergies:      NKDA      Obstetric History:      G1, current    Gynecologic History:      Denies recent/active STI  Denies history abnormal Pap     Social History:      Denies tobacco, alcohol or illicit drug use  Current partner is father of baby  Denies domestic abuse     Family History:      Denies congenital anomalies, inherited syndromes, fetal aneuploidy    Review of Systems:      Constitutional:  No fever, fatigue  HENT:  No congestion, hearing changes  Eyes:  No visual disturbance  Respiratory:  No cough, shortness of breath  Cardiovascular:  No chest pain, leg swelling  Breast:  No lump, pain, nipple discharge, redness, skin changes  Gastrointestinal:  No abdominal pain, constipation, blood in stool   Genitourinary:  No dysuria, frequency  Endocrine:  No heat or cold intolerance  Musculoskeletal:  No back pain, arthralgias  Skin:  No rash, jaundice  Neurological:  No dizziness, weakness, headaches  Psychiatric/Behavioral:  No sleep disturbance, dysphoric mood     Physical Exam:     /74 (BP Location: Right arm, Patient Position:  "Sitting)   Ht 5' 7" (1.702 m)   Wt 120.1 kg (264 lb 12.4 oz)   LMP 2019 (Exact Date)   BMI 41.47 kg/m²      GENERAL:  NAD. Well-nourished. A&Ox3.  HEENT:  NCAT, EOMI, moist mucus membranes.  Neck supple w/o masses.  BREAST:  Symmetric, no obvious masses, adenopathy, skin changes or nipple discharge.  LUNGS:  CTA-B.  HEART:  RRR, physiologic heart sounds.  ABDOMEN:  Soft, non-tender. Normoactive BS.  No obvious organomegaly.    EXT:  Symmetric w/o cramping, claudication, or edema. +2 distal pulses. FROM.  SKIN:  No rashes  NEURO:  Grossly intact bilaterally. +2 DTR.  PSYCH:  Mood & affect appropriate.       PELVIC:  Female external genitalia w/o any obvious lesions.  Adequate perineal body. Normal urethral meatus. No gross lymphadenopathy.    Vagina:  Pink, moist, well-rugated.  Vaginal vault with good support.  No obvious lesion.  No discharge noted.     Cervix:  No cervical motion tenderness, discharge, or obvious lesions.  Closed, thick, posterior.  Uterus:  Small, non-tender, normal contour.  Adnexa:  No masses or tenderness.    Rectal:  Declined.  No obvious external lesions.   Wet prep:  Negative    Chaperone present for exam.    Assessment:     1. 24 y.o. , UPT positive, LMP 2019, here for confirmation of pregnancy  2. DM, insulin dependent, recently diagnosed  3. Maternal obesity - Body mass index is 41.47 kg/m².    Plan:    We discussed principles of prenatal care, weight gain goals, dietary/lifestyle modifications, pregnancy care instructions and precautions.  Prenatal educational material given to pt.  Continue prenatal vitamins.  SAB and ectopic precautions reviewed.      We discussed the effects of uncontrolled DM on her pregnancy including but not limited to risk of spontaneous , congenital malformations, fetal growth abnormalities, risk preE, fetal demise, labor dystocia, risk of , intra/post-partum infections, DVT/VTE.  Diabetes testing supplies ordered and " parameters to call reviewed.   Start NPH 40 units in AM, 16 units PM.  Novolog 10 units with each meal.  Call MD if fasting > 120, 2hr > 160.   Pt was instructed to bring glucose log to every visit.  ADA diet reviewed.  Encourage healthy lifestyle modifications.  Hyper/hypo-glycemia action plan reviewed.    Discussed implications of obesity on pregnancy including but are not limited to miscarriage, poor ultrasound visualization, increase in congenital malformations (especially NTD's and cardiac anomalies),  birth, ascending infections, gestational diabetes, hypertensive diseases of pregnancy, macrosomia, shoulder dystocia, cerebral palsy and surgical complications such as wound infections, dehiscence and thrombosis.  The [place] of Medicine has recommended no more than a 15# weight gain in patients with class III (BMI >/= 40) obesity or greater.  Encourage healthy lifestyle modifications.  Pt declined early DM testing.    Discussed daily low dose ASA ~14 wks for prevention of preE, IUGR, and stillbirths due to maternal risk factor.    Return in 1 week, or sooner prn.  Go to ED for emergencies.  All questions answered, pt voiced understanding.      Eduard Art MD

## 2019-09-06 ENCOUNTER — INITIAL PRENATAL (OUTPATIENT)
Dept: OBSTETRICS AND GYNECOLOGY | Facility: CLINIC | Age: 24
End: 2019-09-06
Payer: MEDICAID

## 2019-09-06 VITALS
SYSTOLIC BLOOD PRESSURE: 120 MMHG | DIASTOLIC BLOOD PRESSURE: 78 MMHG | WEIGHT: 267.31 LBS | BODY MASS INDEX: 41.87 KG/M2 | HEART RATE: 68 BPM

## 2019-09-06 DIAGNOSIS — O99.210 MATERNAL OBESITY AFFECTING PREGNANCY, ANTEPARTUM: ICD-10-CM

## 2019-09-06 DIAGNOSIS — O24.319 PREEXISTING DIABETES COMPLICATING PREGNANCY, ANTEPARTUM: ICD-10-CM

## 2019-09-06 DIAGNOSIS — Z3A.17 17 WEEKS GESTATION OF PREGNANCY: Primary | ICD-10-CM

## 2019-09-06 PROCEDURE — 99213 OFFICE O/P EST LOW 20 MIN: CPT | Mod: TH,S$PBB,, | Performed by: OBSTETRICS & GYNECOLOGY

## 2019-09-06 PROCEDURE — 99999 PR PBB SHADOW E&M-EST. PATIENT-LVL III: ICD-10-PCS | Mod: PBBFAC,,, | Performed by: OBSTETRICS & GYNECOLOGY

## 2019-09-06 PROCEDURE — 99213 OFFICE O/P EST LOW 20 MIN: CPT | Mod: PBBFAC,TH | Performed by: OBSTETRICS & GYNECOLOGY

## 2019-09-06 PROCEDURE — 99999 PR PBB SHADOW E&M-EST. PATIENT-LVL III: CPT | Mod: PBBFAC,,, | Performed by: OBSTETRICS & GYNECOLOGY

## 2019-09-06 PROCEDURE — 99213 PR OFFICE/OUTPT VISIT, EST, LEVL III, 20-29 MIN: ICD-10-PCS | Mod: TH,S$PBB,, | Performed by: OBSTETRICS & GYNECOLOGY

## 2019-09-06 RX ORDER — PEN NEEDLE, DIABETIC 32GX 5/32"
NEEDLE, DISPOSABLE MISCELLANEOUS
Refills: 5 | COMMUNITY
Start: 2019-08-29

## 2019-09-06 RX ORDER — PNV,CALCIUM 72/IRON/FOLIC ACID 27 MG-1 MG
1 TABLET ORAL DAILY
Refills: 11 | COMMUNITY
Start: 2019-08-29

## 2019-09-06 RX ORDER — SYRING-NEEDL,DISP,INSUL,0.3 ML 31 GX5/16"
SYRINGE, EMPTY DISPOSABLE MISCELLANEOUS
Refills: 5 | COMMUNITY
Start: 2019-08-29

## 2019-09-06 NOTE — PROGRESS NOTES
[facility]  Ambulatory Clinic  Obstetrics & Gynecology    Visit Date:  2019     Chief Complaint:  Missed my period    History of Present Illness:      Marissa Lewis is a 24 y.o. , UPT positive today, here with c/o missed period.    Patient's last menstrual period was 2019.      Pt has no major complaints today and denies any vaginal bleeding, discharge, pain, GI/ compliants.      Medical history is remarkable for DM, recently dx, admitted on  for glucose control and initiation of insulin therapy.    Pt states she did not  her glucose testing supplies or insulin since discharge from hospital.    Pt reports overall good health.    Past Medical History:      Diabetes on insuilin   Obesity     Past Surgical History:     History reviewed. No pertinent surgical history.     Medications:     Prenatal vitamins   Insulin     Allergies:      NKDA      Obstetric History:      G1, current    Gynecologic History:      Denies recent/active STI  Denies history abnormal Pap     Social History:      Denies tobacco, alcohol or illicit drug use  Current partner is father of baby  Denies domestic abuse     Family History:      Denies congenital anomalies, inherited syndromes, fetal aneuploidy    Review of Systems:      Constitutional:  No fever, fatigue  HENT:  No congestion, hearing changes  Eyes:  No visual disturbance  Respiratory:  No cough, shortness of breath  Cardiovascular:  No chest pain, leg swelling  Breast:  No lump, pain, nipple discharge, redness, skin changes  Gastrointestinal:  No abdominal pain, constipation, blood in stool   Genitourinary:  No dysuria, frequency  Endocrine:  No heat or cold intolerance  Musculoskeletal:  No back pain, arthralgias  Skin:  No rash, jaundice  Neurological:  No dizziness, weakness, headaches  Psychiatric/Behavioral:  No sleep disturbance, dysphoric mood     Physical Exam:     /74 (BP Location: Right arm, Patient Position:  "Sitting)   Ht 5' 7" (1.702 m)   Wt 120.1 kg (264 lb 12.4 oz)   LMP 2019 (Exact Date)   BMI 41.47 kg/m²      GENERAL:  NAD. Well-nourished. A&Ox3.  HEENT:  NCAT, EOMI, moist mucus membranes.  Neck supple w/o masses.  BREAST:  Symmetric, no obvious masses, adenopathy, skin changes or nipple discharge.  LUNGS:  CTA-B.  HEART:  RRR, physiologic heart sounds.  ABDOMEN:  Soft, non-tender. Normoactive BS.  No obvious organomegaly.    EXT:  Symmetric w/o cramping, claudication, or edema. +2 distal pulses. FROM.  SKIN:  No rashes  NEURO:  Grossly intact bilaterally. +2 DTR.  PSYCH:  Mood & affect appropriate.       PELVIC:  Female external genitalia w/o any obvious lesions.  Adequate perineal body. Normal urethral meatus. No gross lymphadenopathy.    Vagina:  Pink, moist, well-rugated.  Vaginal vault with good support.  No obvious lesion.  No discharge noted.     Cervix:  No cervical motion tenderness, discharge, or obvious lesions.  Closed, thick, posterior.  Uterus:  Small, non-tender, normal contour.  Adnexa:  No masses or tenderness.    Rectal:  Declined.  No obvious external lesions.   Wet prep:  Negative    Chaperone present for exam.    Assessment:     1. 24 y.o. , UPT positive, LMP 2019, here for confirmation of pregnancy  2. DM, insulin dependent, recently diagnosed  3. Maternal obesity - BMI > 40    Plan:    We discussed principles of prenatal care, weight gain goals, dietary/lifestyle modifications, pregnancy care instructions and precautions.  Prenatal educational material given to pt.  Continue prenatal vitamins.  SAB and ectopic precautions reviewed.      We discussed the effects of uncontrolled DM on her pregnancy including but not limited to risk of spontaneous , congenital malformations, fetal growth abnormalities, risk preE, fetal demise, labor dystocia, risk of , intra/post-partum infections, DVT/VTE.  Diabetes testing supplies ordered and parameters to call reviewed. "   Start NPH 40 units in AM, 16 units PM.  Novolog 10 units with each meal.  Call MD if fasting > 120, 2hr > 160.   Pt was instructed to bring glucose log to every visit.  ADA diet reviewed.  Encourage healthy lifestyle modifications.  Hyper/hypo-glycemia action plan reviewed.    Discussed implications of obesity on pregnancy including but are not limited to miscarriage, poor ultrasound visualization, increase in congenital malformations (especially NTD's and cardiac anomalies),  birth, ascending infections, gestational diabetes, hypertensive diseases of pregnancy, macrosomia, shoulder dystocia, cerebral palsy and surgical complications such as wound infections, dehiscence and thrombosis.  The [place] of Medicine has recommended no more than a 15# weight gain in patients with class III (BMI >/= 40) obesity or greater.  Encourage healthy lifestyle modifications.      Discussed daily low dose ASA ~14 wks for prevention of preE, IUGR, and stillbirths due to maternal risk factor.    Return in 1 week, or sooner prn.  Go to ED for emergencies.  All questions answered, pt voiced understanding.      Eduard Art MD  _____________________________________________________________________    2019    17w3d here for initial OB visit.    Pregnancy complicated by:  DM, insulin dependent, recently diagnosed  Maternal obesity - BMI > 40  Late prenatal care    Pt has no major complaints today.    Glucose log reviewed, see below.  Pt state she is tolerating insulin therapy well.  Pt currently on NPH 40 units in AM, 16 units in PM, and Novolog 10 units with each meal.  No change on insulin regimen today, will see glucose trend next week.  We reviewed the effects of GDM/uncontrolled glucose on her pregnancy.  Diabetes testing supplies ordered and parameters to call reviewed.   Go to L&D if fasting > 120, 2hr > 160.   Pt was instructed to bring glucose log to every visit.  ADA diet reviewed.  Encourage healthy lifestyle  modifications.  Hyper/hypo-glycemia action plan reviewed.  Refer to MFM for US and consult for DM.  Increase folic acid supplementation.    Discussed implications of obesity on pregnancy.  The [place] of Medicine has recommended no more than a 15# weight gain in patients with class III (BMI >/= 40) obesity or greater.    Encourage healthy lifestyle modifications.    Discussed daily low dose ASA ~14 wks for prevention of preE, IUGR, and stillbirths due to maternal risk factor.    Order initial OB labs.  Discussed 2nd trimester aneuploidy screening options, pt desires only quad screen at this time.    Importance of prenatal care discussed.  Principles of prenatal care and precautions reviewed.  Return 1 wk to review glucose log, or sooner prn.  Voiced understanding.  Pt sister present for visit.      Eduard Art MD    Glucose log:      Outside dating US report:            _____________________________________________________________________

## 2019-09-09 ENCOUNTER — PROCEDURE VISIT (OUTPATIENT)
Dept: MATERNAL FETAL MEDICINE | Facility: CLINIC | Age: 24
End: 2019-09-09
Payer: MEDICAID

## 2019-09-09 ENCOUNTER — INITIAL CONSULT (OUTPATIENT)
Dept: MATERNAL FETAL MEDICINE | Facility: CLINIC | Age: 24
End: 2019-09-09
Payer: MEDICAID

## 2019-09-09 ENCOUNTER — LAB VISIT (OUTPATIENT)
Dept: LAB | Facility: HOSPITAL | Age: 24
End: 2019-09-09
Attending: OBSTETRICS & GYNECOLOGY
Payer: MEDICAID

## 2019-09-09 VITALS
DIASTOLIC BLOOD PRESSURE: 66 MMHG | BODY MASS INDEX: 41.75 KG/M2 | WEIGHT: 266.56 LBS | SYSTOLIC BLOOD PRESSURE: 104 MMHG

## 2019-09-09 DIAGNOSIS — Z3A.17 17 WEEKS GESTATION OF PREGNANCY: ICD-10-CM

## 2019-09-09 DIAGNOSIS — Z36.89 ENCOUNTER FOR FETAL ANATOMIC SURVEY: ICD-10-CM

## 2019-09-09 DIAGNOSIS — E11.65 TYPE 2 DIABETES MELLITUS WITH HYPERGLYCEMIA, WITHOUT LONG-TERM CURRENT USE OF INSULIN: ICD-10-CM

## 2019-09-09 DIAGNOSIS — O24.319 PREEXISTING DIABETES COMPLICATING PREGNANCY, ANTEPARTUM: ICD-10-CM

## 2019-09-09 DIAGNOSIS — O26.92 PREGNANCY, COMPLICATIONS OF, SECOND TRIMESTER: Primary | ICD-10-CM

## 2019-09-09 LAB
ABO + RH BLD: NORMAL
ALBUMIN SERPL BCP-MCNC: 3.5 G/DL (ref 3.5–5.2)
ALP SERPL-CCNC: 40 U/L (ref 55–135)
ALT SERPL W/O P-5'-P-CCNC: 20 U/L (ref 10–44)
ANION GAP SERPL CALC-SCNC: 5 MMOL/L (ref 8–16)
AST SERPL-CCNC: 20 U/L (ref 10–40)
BILIRUB SERPL-MCNC: 0.2 MG/DL (ref 0.1–1)
BLD GP AB SCN CELLS X3 SERPL QL: NORMAL
BUN SERPL-MCNC: 6 MG/DL (ref 6–20)
CALCIUM SERPL-MCNC: 9.5 MG/DL (ref 8.7–10.5)
CHLORIDE SERPL-SCNC: 106 MMOL/L (ref 95–110)
CO2 SERPL-SCNC: 23 MMOL/L (ref 23–29)
CREAT SERPL-MCNC: 0.7 MG/DL (ref 0.5–1.4)
EST. GFR  (AFRICAN AMERICAN): >60 ML/MIN/1.73 M^2
EST. GFR  (NON AFRICAN AMERICAN): >60 ML/MIN/1.73 M^2
ESTIMATED AVG GLUCOSE: 189 MG/DL (ref 68–131)
GLUCOSE SERPL-MCNC: 134 MG/DL (ref 70–110)
HBA1C MFR BLD HPLC: 8.2 % (ref 4–5.6)
POTASSIUM SERPL-SCNC: 3.4 MMOL/L (ref 3.5–5.1)
PROT SERPL-MCNC: 7 G/DL (ref 6–8.4)
SODIUM SERPL-SCNC: 134 MMOL/L (ref 136–145)

## 2019-09-09 PROCEDURE — 83020 HEMOGLOBIN ELECTROPHORESIS: CPT

## 2019-09-09 PROCEDURE — 86592 SYPHILIS TEST NON-TREP QUAL: CPT

## 2019-09-09 PROCEDURE — 99204 PR OFFICE/OUTPT VISIT, NEW, LEVL IV, 45-59 MIN: ICD-10-PCS | Mod: S$PBB,TH,25, | Performed by: OBSTETRICS & GYNECOLOGY

## 2019-09-09 PROCEDURE — 76811 OB US DETAILED SNGL FETUS: CPT | Mod: 26,S$PBB,, | Performed by: OBSTETRICS & GYNECOLOGY

## 2019-09-09 PROCEDURE — 36415 COLL VENOUS BLD VENIPUNCTURE: CPT

## 2019-09-09 PROCEDURE — 86703 HIV-1/HIV-2 1 RESULT ANTBDY: CPT

## 2019-09-09 PROCEDURE — 99204 OFFICE O/P NEW MOD 45 MIN: CPT | Mod: S$PBB,TH,25, | Performed by: OBSTETRICS & GYNECOLOGY

## 2019-09-09 PROCEDURE — 83036 HEMOGLOBIN GLYCOSYLATED A1C: CPT

## 2019-09-09 PROCEDURE — 76811 PR US, OB FETAL EVAL & EXAM, TRANSABDOM,FIRST GESTATION: ICD-10-PCS | Mod: 26,S$PBB,, | Performed by: OBSTETRICS & GYNECOLOGY

## 2019-09-09 PROCEDURE — 86850 RBC ANTIBODY SCREEN: CPT

## 2019-09-09 PROCEDURE — 86803 HEPATITIS C AB TEST: CPT

## 2019-09-09 PROCEDURE — 81511 FTL CGEN ABNOR FOUR ANAL: CPT

## 2019-09-09 PROCEDURE — 76811 OB US DETAILED SNGL FETUS: CPT | Mod: PBBFAC,PO | Performed by: OBSTETRICS & GYNECOLOGY

## 2019-09-09 PROCEDURE — 80053 COMPREHEN METABOLIC PANEL: CPT

## 2019-09-09 PROCEDURE — 86762 RUBELLA ANTIBODY: CPT

## 2019-09-09 PROCEDURE — 87340 HEPATITIS B SURFACE AG IA: CPT

## 2019-09-09 NOTE — LETTER
September 10, 2019      Eduard Art MD  [address] [facility] [address]  [address]  [place] [address]           [facility]ERNAL [facility] FETAL MEDICINE  [address] [facility] [address]  [place] [address]          Patient: Marissa Lewis   MR Number: [id number]   YOB: 1995   Date of Visit: 9/9/2019       Dear Dr. Eduard Art:    Thank you for referring Marissa Lewis to me for evaluation. Attached you will find relevant portions of my assessment and plan of care.    If you have questions, please do not hesitate to call me. I look forward to following Marissa Lewis along with you.    Sincerely,    Carine Sandoval MD    Enclosure  CC:  No Recipients    If you would like to receive this communication electronically, please contact [email or web address] or [phone] to request more information on [employer] Link access.    For providers and/or their staff who would like to refer a patient to [facility], please contact us through our one-stop-shop provider referral line, [facility], at [phone].    If you feel you have received this communication in error or would no longer like to receive these types of communications, please e-mail [email or web address]

## 2019-09-10 LAB
HBV SURFACE AG SERPL QL IA: NEGATIVE
HCV AB SERPL QL IA: NEGATIVE
HIV 1+2 AB+HIV1 P24 AG SERPL QL IA: NEGATIVE
RPR SER QL: NORMAL

## 2019-09-10 NOTE — PROGRESS NOTES
Indication  ========    Consultation. Diabetes mellitus. Fetal anatomy survey    History  ======    Risk Factors  History risk factors: Diabetes mellitus    Pregnancy History  ==============    Genetic screening declined    Maternal Assessment  =================    BP syst 104 mmHg  BP diast 66 mmHg    Method  ======    2D Color Doppler, Voluson S8, Transabdominal ultrasound examination. View: Suboptimal view: limited by early gestational age    Pregnancy  =========    Strong pregnancy. Number of fetuses: 1    Dating  ======    LMP on: 5/25/2019  Cycle: regular cycle, regular cycle  GA by LMP 15 w + 2 d  GOAPL by LMP: 2/29/2020  GA by prior assessment 17 w + 6 d  GOPAL by prior assessment: 2/11/2020  Ultrasound examination on: 9/9/2019  GA by U/S based upon: AC, BPD, Femur, HC  GA by U/S 17 w + 3 d  GOPAL by U/S: 2/14/2020  Assigned: based on stated GOPAL, selected on 09/9/2019  Assigned GA 17 w + 6 d  Assigned GOPAL: 2/11/2020  Pregnancy length 280 d    General Evaluation  ==============    Cardiac activity present.  bpm.  Fetal movements visualized.  Presentation breech.  Placenta Placental site: posterior.  Umbilical cord Cord vessels: 3 vessel cord. Insertion site: normal insertion.  Amniotic fluid Amount of AF: normal amount.    Fetal Biometry  ============    Standard  BPD 37.8 mm  17w 4d                Hadlock    OFD 46.5 mm  17w 4d                Hi    .6 mm  17w 0d                Hadlock    Cerebellum tr 17.7 mm  18w 1d                [place]    Nuchal fold 3.3 mm  .8 mm  17w 6d                Hadlock    Femur 23.4 mm  17w 0d                Hadlock    Humerus 22.3 mm  16w 6d                Hi    HC / AC 1.11     g    EFW (lb) 0 lb  EFW (oz) 7 oz  EFW by: Hadlock (BPD-HC-AC-FL)  Extended   6.1 mm  CM 2.7 mm    Head / Face / Neck  Cephalic index 0.81    Extremities / Bony Struc  FL / BPD 0.62    FL / AC 0.19    Other Structures   bpm    Fetal  Anatomy  ===========    Cranium: normal  Lateral ventricles: normal  Choroid plexus: normal  Midline falx: normal  Cavum septi pellucidi: suboptimal  Cerebellum: normal  Cisterna magna: normal  Head / Neck  Vermis: normal  Neck: normal  Nuchal fold: normal  Lips: suboptimal  Profile: normal  Nose: suboptimal  RVOT view: suboptimal  LVOT view: suboptimal  Heart / Thorax  4-chamber view: 4-chamber normal, septum normal  Situs: situs solitus (normal)  Aortic arch view: suboptimal  Ductal arch view: normal  SVC: suboptimal  IVC: suboptimal  3-vessel view: suboptimal  3-vessel-trachea view: suboptimal  Rt lung: normal  Lt lung: normal  Diaphragm: suboptimal  Cord insertion: normal  Stomach: normal  Kidneys: normal  Bladder: normal  Genitals: normal  Abdomen  Liver: normal  Bowel: normal  Rt renal artery: normal  Lt renal artery: normal  Cervical spine: suboptimal  Thoracic spine: suboptimal  Lumbar spine: suboptimal  Sacral spine: suboptimal  Rt arm: normal  Lt arm: normal  Rt leg: normal  Lt leg: normal  Position of hands: normal  Position of feet: normal    Maternal Structures  ===============    Uterus / Cervix  Uterus: Normal  Uterine fibroid D1 39 mm  Uterine fibroid D2 49 mm  Uterine fibroid D3 54 mm  Uterine fibroid mean 47.6 mm  Uterine fibroid vol 54.990 cm³  Uterine fibroids findings: Posterior  Uterine fibroid D1 12 mm  Uterine fibroid D2 21 mm  Uterine fibroid D3 18 mm  Uterine fibroid mean 16.6 mm  Uterine fibroid vol 2.200 cm³  Uterine fibroids findings: Posterior  Cervical length 32.8 mm  Ovaries / Tubes / Adnexa  Rt ovary: Normal  Lt ovary: Normal    Consultation  ==========    Type: diabetes  The patient is a 24-year-old  who is at 17 weeks and 6 days gestation based on a 13 week ultrasound who presents for consultation due  to type 2 diabetes. In discussion with the patient, she just recently found out that she had diabetes which was diagnosed in the emergency  department with a glucose of 294. The  patient was hospitalized and was started on insulin. Her current regimen is 40 units of NPH in the  morning and 16 units of NPH at bedtime as well as 10 units of NovoLog with breakfast, 10 with lunch, and 10 with dinner.    Past medical history: Presumed type 2 diabetes diagnosed this pregnancy (a1c elevated), morbid obesity with BMI 41.87  past surgical history: None  medications: Prenatal vitamins, insulin, baby aspirin, folic acid  no known drug allergies  hemoglobin A1c was 9.5 on   family history of birth defects: None, no history of sickle cell    since : Fastings: 91,93,83  2 hr post breakfast: No results  2 hr post lunch: 74, 90, 115  2 hr post dinner: 142, 138, 96  I briefly reviewed her earlier log    patient needs prenatal labs, urine protein creatinine ratio none were available for her visit  quad screen pending    assessment and plan:  1. Intrauterine pregnancy at 17 and 6    2.: Diabetes:Today the patient was counseled on the risks of diabetes in pregnancy. I reviewed the physiologic effects of pregnancy on  diabetes and I stressed with the patient the need for meticulous glucose control. I discussed with the patient the increased risks of fetal  Structural anomalies, macrosomia, prematurity, shoulder dystocia,  hyperbilirubinemia and electrolyte issues, pulmonary immaturity  and sudden stillbirth especially in those patients with poor glucose control. I also discussed with the patient the need for increased fetal  surveillance with serial fetal growth assessments and third trimester fetal testing. I also reviewed the possibility of need for early delivery in  those with poor glucose control or evidence of fetal growth abnormalities.  Recommendations from our MFM group at [facility]:    -Referral to a dietician is recommended. Patient met with educator in hospital  -Diet and/or medication should be used to keep fasting glucose levels <95 mg/dL and 2 hour postprandial levels <120  mg/dL.  -Insulin is the gold standard medication for glucose control.  -Metformin inhibits hepatic gluconeogenesis and glucose absorption while also stimulating peripheral glucose uptake. Metformin crosses the  placenta and fetal metformin concentrations approach those of the mother. While there are not long-term outcome data on metformin use in  pregnancy, at least one study has demonstrated similar 2-year developmental outcomes when compared to insulin (Juanita et al). There does  not appear to be any differences in short-term  outcomes for neonates whose mothers were treated with metformin compared to  insulin. However, there does appear to be a reduction in the rate of hypertensive disease, but a slight increase in the rate of  birth  (Marina et al). Between 26 - 46% of women treated with metformin will ultimately require insulin for glycemic control.  Metformin should be avoided in patients with chronic renal disease. In addition, it should be used with caution in those with chronic liver  disease and elevated LFTs.  -Glyburide is a second-generation sulfonylurea, which works by stimulating the pancreas to release more insulin. This medication is  contraindicated in those with a sulfa allergy. The major side effect of glyburide is therefore hypoglycemia. Two recent meta-analyses (Filomena  et al; Marina et al) have demonstrated worse  outcomes in women treated with glyburide compared to insulin with an increase in  respiratory morbidity, macrosomia, birth injury and hypoglycemia. This is thought to be related to the fact that glyburide crosses the placenta  and stimulates fetal insulin release. In clinical trials, 4-16 % of women fail glyburide and ultimately require insulin.  -We also discussed that typically we do not use dual oral agents to control blood sugars in pregestational diabetes.    -The patient has been under better control. The patient reports recent hospitalization for  diabetes diabetes control. With respect to her regimen,  I recommend she continue NPH 40 units in the morning and 16 units at bedtime. Recommend Novolog 10 units with breakfast, 10 units with  lunch and 12 units with dinner.  Advised to check 2 am blood sugars if any concerns about fastings. She was advised to call if blood sugars less than 70 and over 200.  Discussed correction for hypoglycemia. She has not had any hypoglycemia.      -In those patients with diabetes existing prior to pregnancy we recommend a late first trimester ultrasound to look for any obvious fetal  anomalies and NT if desired, a detailed fetal anatomic survey at 19 to 20 weeks gestation, and a fetal echocardiogram around 22-24 weeks  gestation to rule out congenital heart disease.  -Periodic fetal growth assessments should be performed to assess for growth abnormalities and to assess the fetal anomalies.  -Secondary to the high incidence of preeclampsia in patients with pregestational diabetes we recommend a baseline assessment of renal  function with a 24 hour urine for protein and creatinine clearance or a baseline urine P/C. Baseline preeclampsia labs (CBC, CMP, creatinine)  should also be done.  -The patient should have an eye exam. I would also recommend a podiatric exam. (to be ordered by primary ob)  -Given obesity and diabetes an ekg and maternal echocardiogram are recommended. (to be ordered by primary ob)  -A baby aspirin daily 81mg PO is recommended for preeclampsia prevention after 12 to 14 weeks gestation.  -Supplemental folic acid 4mg PO daily is typically recommended.  -Recommend rechecking hemoglobin A1c through primary ob prior to next MFM appt (after about one month of treatment)  -If type 1 diabetes, recommend checking TSH for a baseline.    - surveillance is recommended at 32 weeks twice weekly with twice weekly NST/weekly MVP or BPP alternating with NST on  Monday/Thursday or Tuesday/Friday scheduled.  -With  respect to delivery timing:  -Recommend delivery at 37 to 37 and 6 weeks given morbid obesity and diabetes.  -Earlier delivery may be needed depending on glucose control and clinical scenario.  -Recommend consideration of earlier delivery if IUGR, HTN, or other complications  -Recommend offering csection for delivery is EFW is 4500g or more near the time of delivery    -Recommend return in 3 weeks for blood sugar evaluation. The patient was advised to call for blood sugars less than 70 or greater than 200  prior to her next appointment. She was also advised that if she notes nausea/vomiting, inability to tolerate PO, or concerns about being able to  take her insulin that she should contact her provider or present to the ED. Patient advised to bring her log to her OB visits for her primary ob to  review, who can call MFM in the meantime if any questions.    -recommend primary ob confirm patient has treatment for hypoglycemia.        3.: Genetic screening: The patient was advised of the options of genetic screening. She has a quad screen that is pending. We discussed the  options of diagnostic testing via amniocentesis and the patient declined. She will await her quad screen results to determine if any other  testing is warranted. Recommend her primary Ob review these results with the patient.    4. Patient also with morbid obesity that is managed by her primary OB. This can be associated with an increased risk of adverse outcomes.  Primary OB should check prenatal labs and review these with the patient.          45 min was spent face-to-face time with greater than half that time spent counseling and coordination of care.    Impression  =========    Strong live intrauterine pregnancy.  Biometry agrees with the clinical dating.  Normal amniotic fluid volume by qualitative assessment.  Some of the fetal anatomy was suboptimally visualized. The fetal anatomy that was seen appeared normal.  Placenta is posterior  without previa.  Transabdominal cervical length is normal.  Fibroids as above.    Recommendation  ==============    Follow up ultrasound in 3 weeks for growth/suboptimal fetal anatomy. Primary ob to call MFM if any questions prior to this.  Fetal echo ordered due to diabetes.  Primary ob should follow up on baseline labs and prenatal labs-they were drawn but not available for review at time of consult.  Patient will need baseline urine p/c ratio and EKG/echocardiogram ordered through primary ob.  Primary ob should confirm podiatry and optho are ordered.  Serial growth,  fetal surveillance, delivery timing dependent on control (likely 37 weeks given morbid obesity).  Primary ob should check hgba1c after about one month of treatment.  Primary ob managing obesity.  Primary ob should review results of aneuploidy screening with the patient.  Primary ob should make sure patient has hypoglycemia treatment.  Increased Novolog at dinner.  Recommend baby asa and folic acid as above.  See note above.

## 2019-09-11 LAB
# FETUSES US: NORMAL
2ND TRIMESTER 4 SCREEN PNL SERPL: NEGATIVE
2ND TRIMESTER 4 SCREEN SERPL-IMP: NORMAL
AFP MOM SERPL: 1.03
AFP SERPL-MCNC: 30 NG/ML
AGE AT DELIVERY: 25
B-HCG MOM SERPL: 1.06
B-HCG SERPL-ACNC: 22.7 IU/ML
FET TS 21 RISK FROM MAT AGE: NORMAL
GA (DAYS): 6 D
GA (WEEKS): 17 WK
GA METHOD: NORMAL
HGB A2 MFR BLD HPLC: 2.8 % (ref 2.2–3.2)
HGB FRACT BLD ELPH-IMP: NORMAL
HGB FRACT BLD ELPH-IMP: NORMAL
IDDM PATIENT QL: NORMAL
INHIBIN A MOM SERPL: 1.37
INHIBIN A SERPL-MCNC: 157 PG/ML
RUBV IGG SER-ACNC: 37.8 IU/ML
RUBV IGG SER-IMP: REACTIVE
SMOKING STATUS FTND: NORMAL
TS 18 RISK FETUS: NORMAL
TS 21 RISK FETUS: NORMAL
U ESTRIOL MOM SERPL: 0.58
U ESTRIOL SERPL-MCNC: 0.65 NG/ML

## 2019-09-13 ENCOUNTER — ROUTINE PRENATAL (OUTPATIENT)
Dept: OBSTETRICS AND GYNECOLOGY | Facility: CLINIC | Age: 24
End: 2019-09-13
Payer: MEDICAID

## 2019-09-13 VITALS
WEIGHT: 266.75 LBS | SYSTOLIC BLOOD PRESSURE: 120 MMHG | BODY MASS INDEX: 41.78 KG/M2 | DIASTOLIC BLOOD PRESSURE: 80 MMHG

## 2019-09-13 DIAGNOSIS — Z3A.18 18 WEEKS GESTATION OF PREGNANCY: Primary | ICD-10-CM

## 2019-09-13 PROCEDURE — 99999 PR PBB SHADOW E&M-EST. PATIENT-LVL III: CPT | Mod: PBBFAC,,, | Performed by: OBSTETRICS & GYNECOLOGY

## 2019-09-13 PROCEDURE — 99213 OFFICE O/P EST LOW 20 MIN: CPT | Mod: TH,S$PBB,, | Performed by: OBSTETRICS & GYNECOLOGY

## 2019-09-13 PROCEDURE — 99213 PR OFFICE/OUTPT VISIT, EST, LEVL III, 20-29 MIN: ICD-10-PCS | Mod: TH,S$PBB,, | Performed by: OBSTETRICS & GYNECOLOGY

## 2019-09-13 PROCEDURE — 99213 OFFICE O/P EST LOW 20 MIN: CPT | Mod: PBBFAC | Performed by: OBSTETRICS & GYNECOLOGY

## 2019-09-13 PROCEDURE — 99999 PR PBB SHADOW E&M-EST. PATIENT-LVL III: ICD-10-PCS | Mod: PBBFAC,,, | Performed by: OBSTETRICS & GYNECOLOGY

## 2019-09-13 NOTE — PROGRESS NOTES
18w3d for initial OB visit.     Pregnancy complicated by:  DM, insulin dependent, recently diagnosed  Maternal obesity - BMI > 40  Late prenatal care     Pt has no major complaints today.  Reports active fetus.  Denies vaginal bleeding, leakage of fluid, or contractions.     Glucose log reviewed, see below.  Pt state she is tolerating insulin therapy well.  Pt currently on NPH 40 units in AM, 16 units in PM, and Novolog 12 units with each meal.  No change on insulin regimen today.  We reviewed the effects of GDM/uncontrolled glucose on her pregnancy.  Diabetes testing supplies ordered and parameters to call reviewed.   Go to L&D if fasting > 120, 2hr > 160.   Pt was instructed to bring glucose log to every visit.  ADA diet reviewed.   Encourage healthy lifestyle modifications.  Hyper/hypo-glycemia action plan reviewed.  Refer to [facility] for US and consult for DM.  Increase folic acid supplementation.     Discussed implications of obesity on pregnancy.  Weight gain in patients with class III (BMI >/= 40) obesity or greater.    Encourage healthy lifestyle modifications.    Discussed daily low dose ASA ~14 wks for prevention of preE, IUGR, and stillbirths due to maternal risk factor.     Initial OB lab results d/w pt.  Quad screen negative.  Pt seen [facility] for anatomy US, appreciate recommendations.  Normal appearing fetus.   Discussed implications of fibroids on pregnancy, pt desires expectant management.    Principles of prenatal care and precautions reviewed.  Return 1 wk to review glucose log, or sooner prn.  Voiced understanding.        Eduard Art MD     Glucose log:

## 2019-09-20 ENCOUNTER — HOSPITAL ENCOUNTER (OUTPATIENT)
Dept: CARDIOLOGY | Facility: HOSPITAL | Age: 24
Discharge: HOME | End: 2019-09-20
Attending: OBSTETRICS & GYNECOLOGY
Payer: MEDICAID

## 2019-09-20 ENCOUNTER — ROUTINE PRENATAL (OUTPATIENT)
Dept: OBSTETRICS AND GYNECOLOGY | Facility: CLINIC | Age: 24
End: 2019-09-20
Payer: MEDICAID

## 2019-09-20 VITALS
SYSTOLIC BLOOD PRESSURE: 122 MMHG | BODY MASS INDEX: 41.92 KG/M2 | DIASTOLIC BLOOD PRESSURE: 84 MMHG | WEIGHT: 267.63 LBS

## 2019-09-20 DIAGNOSIS — N76.0 BV (BACTERIAL VAGINOSIS): ICD-10-CM

## 2019-09-20 DIAGNOSIS — Z3A.18 18 WEEKS GESTATION OF PREGNANCY: ICD-10-CM

## 2019-09-20 DIAGNOSIS — E11.65 TYPE 2 DIABETES MELLITUS WITH HYPERGLYCEMIA, WITH LONG-TERM CURRENT USE OF INSULIN: ICD-10-CM

## 2019-09-20 DIAGNOSIS — B96.89 BV (BACTERIAL VAGINOSIS): ICD-10-CM

## 2019-09-20 DIAGNOSIS — Z79.4 TYPE 2 DIABETES MELLITUS WITH HYPERGLYCEMIA, WITH LONG-TERM CURRENT USE OF INSULIN: ICD-10-CM

## 2019-09-20 DIAGNOSIS — Z3A.19 19 WEEKS GESTATION OF PREGNANCY: Primary | ICD-10-CM

## 2019-09-20 DIAGNOSIS — O99.210 MATERNAL OBESITY AFFECTING PREGNANCY, ANTEPARTUM: ICD-10-CM

## 2019-09-20 PROCEDURE — 99213 PR OFFICE/OUTPT VISIT, EST, LEVL III, 20-29 MIN: ICD-10-PCS | Mod: TH,S$PBB,, | Performed by: OBSTETRICS & GYNECOLOGY

## 2019-09-20 PROCEDURE — 99999 PR PBB SHADOW E&M-EST. PATIENT-LVL II: CPT | Mod: PBBFAC,,, | Performed by: OBSTETRICS & GYNECOLOGY

## 2019-09-20 PROCEDURE — 99212 OFFICE O/P EST SF 10 MIN: CPT | Mod: PBBFAC,TH | Performed by: OBSTETRICS & GYNECOLOGY

## 2019-09-20 PROCEDURE — 93005 ELECTROCARDIOGRAM TRACING: CPT

## 2019-09-20 PROCEDURE — 93010 ELECTROCARDIOGRAM REPORT: CPT | Mod: ,,, | Performed by: INTERNAL MEDICINE

## 2019-09-20 PROCEDURE — 93010 EKG 12-LEAD: ICD-10-PCS | Mod: ,,, | Performed by: INTERNAL MEDICINE

## 2019-09-20 PROCEDURE — 99999 PR PBB SHADOW E&M-EST. PATIENT-LVL II: ICD-10-PCS | Mod: PBBFAC,,, | Performed by: OBSTETRICS & GYNECOLOGY

## 2019-09-20 PROCEDURE — 99213 OFFICE O/P EST LOW 20 MIN: CPT | Mod: TH,S$PBB,, | Performed by: OBSTETRICS & GYNECOLOGY

## 2019-09-20 RX ORDER — METRONIDAZOLE 500 MG/1
500 TABLET ORAL EVERY 12 HOURS
Qty: 14 TABLET | Refills: 0 | Status: SHIPPED | OUTPATIENT
Start: 2019-09-20 | End: 2019-09-27

## 2019-09-20 NOTE — PROGRESS NOTES
19w3d for OB visit.     Pregnancy complicated by:  DM, insulin dependent  Maternal obesity - BMI > 40    Pt c/o mild vaginal discharge.  BV noted on exam.  Pt reports taking frequent baths.  Flagyl 500 mg po bid x 7 days for bacterial vaginosis.  No alcohol while on antibx.  Hygiene advice.  Risks, benefits, and alternatives to flagyl discussed.    Otherwise, pt has no major complaints today.  Reports active fetus.  Denies vaginal bleeding, leakage of fluid, or contractions.    Glucose log reviewed, much improved, see below.  Pt state she is tolerating insulin therapy well.  Pt currently on NPH 40 units in AM, 16 units in PM, and Novolog 10 units with each meal.  Keep NPH same, decrease Novolog 8 units with each meal.  We reviewed the effects of GDM/uncontrolled glucose on her pregnancy.  Diabetes testing supplies ordered and parameters to call reviewed.   Go to L&D if fasting > 120, 2hr > 160.   Pt was instructed to bring glucose log to every visit.  ADA diet reviewed.   Encourage healthy lifestyle modifications.  Hyper/hypo-glycemia action plan reviewed.  Refer to [facility] for US and consult for DM.  Increase folic acid supplementation.  Start  testing as clinically indicated.  Eye/foot exam recommended, list of providers given to pt to call and schedule.     Discussed implications of obesity on pregnancy.  Weight gain goals discussed.    Encourage healthy lifestyle modifications.    Encourage daily low dose ASA.     Pt seen [facility] for anatomy US, appreciate recommendations.  Normal appearing fetus.     Precautions reviewed.  Return 1 wk to review glucose log, or sooner prn.  Voiced understanding.        Eduard Art MD     Glucose log:

## 2019-09-30 ENCOUNTER — PROCEDURE VISIT (OUTPATIENT)
Dept: MATERNAL FETAL MEDICINE | Facility: CLINIC | Age: 24
End: 2019-09-30
Payer: MEDICAID

## 2019-09-30 VITALS
SYSTOLIC BLOOD PRESSURE: 118 MMHG | WEIGHT: 264.56 LBS | DIASTOLIC BLOOD PRESSURE: 56 MMHG | BODY MASS INDEX: 41.43 KG/M2

## 2019-09-30 DIAGNOSIS — E11.65 TYPE 2 DIABETES MELLITUS WITH HYPERGLYCEMIA, UNSPECIFIED WHETHER LONG TERM INSULIN USE: ICD-10-CM

## 2019-09-30 DIAGNOSIS — O26.92 PREGNANCY, COMPLICATIONS OF, SECOND TRIMESTER: ICD-10-CM

## 2019-09-30 DIAGNOSIS — O26.92 PREGNANCY, COMPLICATIONS OF, SECOND TRIMESTER: Primary | ICD-10-CM

## 2019-09-30 PROCEDURE — 99213 OFFICE O/P EST LOW 20 MIN: CPT | Mod: S$PBB,TH,, | Performed by: OBSTETRICS & GYNECOLOGY

## 2019-09-30 PROCEDURE — 76816 PR  US,PREGNANT UTERUS,F/U,TRANSABD APP: ICD-10-PCS | Mod: 26,S$PBB,, | Performed by: OBSTETRICS & GYNECOLOGY

## 2019-09-30 PROCEDURE — 76816 OB US FOLLOW-UP PER FETUS: CPT | Mod: 26,S$PBB,, | Performed by: OBSTETRICS & GYNECOLOGY

## 2019-09-30 PROCEDURE — 99213 PR OFFICE/OUTPT VISIT, EST, LEVL III, 20-29 MIN: ICD-10-PCS | Mod: S$PBB,TH,, | Performed by: OBSTETRICS & GYNECOLOGY

## 2019-09-30 PROCEDURE — 76816 OB US FOLLOW-UP PER FETUS: CPT | Mod: PBBFAC,PO | Performed by: OBSTETRICS & GYNECOLOGY

## 2019-09-30 NOTE — PROGRESS NOTES
Indication  ========    Follow-up evaluation of anatomy. Evaluation of fetal growth    History  ======    Risk Factors  History risk factors: Diabetes mellitus    Pregnancy History  ==============    Genetic screening declined    Maternal Assessment  =================    Weight 120 kg  Weight (lb) 265 lb  BP syst 118 mmHg  BP diast 56 mmHg    Method  ======    2D Color Doppler, Voluson E6, Transabdominal ultrasound examination. View: Suboptimal view: limited by fetal position. Suboptimal view:  limited by maternal body habitus    Pregnancy  =========    Strong pregnancy. Number of fetuses: 1    Dating  ======    LMP on: 5/25/2019  Cycle: regular cycle,  GA by LMP 18 w + 2 d  GOPAL by LMP: 2/29/2020  GA by prior assessment 20 w + 6 d  GOPAL by prior assessment: 2/11/2020  Ultrasound examination on: 9/30/2019  GA by U/S based upon: AC, BPD, Femur, HC  GA by U/S 20 w + 1 d  GOPAL by U/S: 2/16/2020  Assigned: based on stated GOPAL, selected on 09/9/2019  Assigned GA 20 w + 6 d  Assigned GOPAL: 2/11/2020  Pregnancy length 280 d    General Evaluation  ==============    Cardiac activity present.  bpm.  Fetal movements visualized.  Presentation breech.  Placenta posterior.  Umbilical cord 3 vessel cord.  Amniotic fluid normal amount.    Fetal Biometry  ============    Standard  BPD 46.8 mm  20w 1d        Hadlock    OFD 59.5 mm  20w 4d        Hi    .7 mm  19w 5d        Hadlock    .4 mm  21w 0d        Hadlock    Femur 30.5 mm  19w 3d        Hadlock    HC / AC 1.07     g          18%        [place]    EFW (lb) 0 lb  EFW (oz) 12 oz  EFW by: [place]lock (BPD-HC-AC-FL)  Head / Face / Neck  Cephalic index 0.79    Extremities / Bony Struc  FL / BPD 0.65    FL / HC 0.18    FL / AC 0.19    Other Structures   bpm    Fetal Anatomy  ============    Cranium: normal  Cavum septi pellucidi: normal  Lips: normal  Nose: normal  4-chamber view: documented previously  RVOT view: suboptimal  LVOT  view: suboptimal  Heart / Thorax  Aortic arch view: normal  SVC: normal  IVC: normal  3-vessel view: suboptimal  3-vessel-trachea view: suboptimal  Diaphragm: normal  Stomach: normal  Kidneys: normal  Bladder: normal  Cervical spine: suboptimal  Thoracic spine: suboptimal  Lumbar spine: suboptimal  Sacral spine: suboptimal  Wants to know gender: yes  Other: A full anatomic survey has been previously performed.    Maternal Structures  ===============    Uterus / Cervix  Uterine fibroid D1 24 mm  Uterine fibroid D2 13 mm  Uterine fibroid D3 20 mm  Uterine fibroid mean 18.8 mm  Uterine fibroid vol 3.174 cm³  Uterine fibroids findings: Posterior  Uterine fibroid D1 42 mm  Uterine fibroid D2 42 mm  Uterine fibroid D3 44 mm  Uterine fibroid mean 42.9 mm  Uterine fibroid vol 41.105 cm³  Uterine fibroids findings: Posterior  Approach: Transabdominal  Cervical length 34.0 mm    Consultation  ==========    Type: Follow up  The patient presents for follow-up today. She did not bring her blood sugar log. She reports that she is taking NovoLog 8 units with each meal  at breakfast lunch and dinner and NPH 40 units in the morning and NPH 16 units at bedtime. She reports that her fastings have had some  elevations. She has not been checking to 2 am sugars but does not feel that they have been low.    Hemoglobin electrophoresis negative, hemoglobin A1c  8.2 with prior 9.5, quad negative, potassium 3.4, creatinine 0.7, AST 20, ALT 20,  platelets 227, hemoglobin 12, normal EKG  no PC ratio noted    I discussed with the patient that she needs to bring her glucose log to her visit on Friday with Dr. Art. If her fastings are still elevated with  no lows around 2 a.m., her NPH at bedtime will need to be increased. She reports that she has been taking this NPH a little bit later than she  should and will move this up by 1 hr.  Primary ob should check previously recommended studies. Patient advised to keep fetal echo. Encourage potassium  rich foods.    15 min was spent face-to-face time with greater than half that time spent in counseling and coordination of care.    Impression  =========    Strong live intrauterine pregnancy.  Overall normal fetal growth.  Normal amniotic fluid volume.  Some of the fetal anatomy was suboptimally visualized. The fetal anatomy that was seen appeared normal.  Fibroids noted as above.    Recommendation  ==============    Follow up in 2 weeks for BS check and FHT with MFM.  Follow up in 5 weeks with MFM for BS check, growth, suboptimal anatomy.  Bring glucose log to primary ob office this week.  Keep fetal echo appointment.  Encourage potassium rich foods.  Recommend primary ob order studies recommended in prior consult.  See note above.

## 2019-10-11 ENCOUNTER — TELEPHONE (OUTPATIENT)
Dept: MATERNAL FETAL MEDICINE | Facility: CLINIC | Age: 24
End: 2019-10-11

## 2019-10-11 NOTE — TELEPHONE ENCOUNTER
Left patient a message to tell her we cancelled her appointment on [facility] and we will do all her appointments here next Monday.      ----- Message from Carine Sandoval MD sent at 10/11/2019 11:34 AM CDT -----  This patient has app[facility] at [facility] on Monday and with [facility] on Monday-has fetal echo here-can we check where she wants to come for bs check and cancel the other one

## 2019-11-04 ENCOUNTER — TELEPHONE (OUTPATIENT)
Dept: OBSTETRICS AND GYNECOLOGY | Facility: CLINIC | Age: 24
End: 2019-11-04

## 2019-11-04 ENCOUNTER — TELEPHONE (OUTPATIENT)
Dept: MATERNAL FETAL MEDICINE | Facility: CLINIC | Age: 24
End: 2019-11-04

## 2019-11-04 NOTE — TELEPHONE ENCOUNTER
Hello,    This is to inform you that your patient was a no show for her MFM appointment today. Please reschedule the pt accordingly.Thank you.  Regards,  Piedad Campbell, RNC-OB

## 2024-01-02 NOTE — PROGRESS NOTES
Pt discharged to home per dr's orders.  Pt verbalized understanding of all discharge instructions including follow up appointment and prescriptions for 4 medications to be filled at pharmacy.  No complaints.  No signs of distress.    normal...

## 2024-08-14 ENCOUNTER — OFFICE VISIT (OUTPATIENT)
Dept: PRIMARY CARE CLINIC | Facility: CLINIC | Age: 29
End: 2024-08-14
Payer: MEDICAID

## 2024-08-14 VITALS
SYSTOLIC BLOOD PRESSURE: 115 MMHG | WEIGHT: 246.25 LBS | HEIGHT: 67 IN | BODY MASS INDEX: 38.65 KG/M2 | RESPIRATION RATE: 16 BRPM | HEART RATE: 81 BPM | OXYGEN SATURATION: 100 % | DIASTOLIC BLOOD PRESSURE: 84 MMHG

## 2024-08-14 DIAGNOSIS — E11.8 TYPE II DIABETES MELLITUS WITH COMPLICATION: ICD-10-CM

## 2024-08-14 DIAGNOSIS — Z00.00 ENCOUNTER FOR MEDICAL EXAMINATION TO ESTABLISH CARE: Primary | ICD-10-CM

## 2024-08-14 DIAGNOSIS — Z76.0 MEDICATION REFILL: ICD-10-CM

## 2024-08-14 PROCEDURE — 99204 OFFICE O/P NEW MOD 45 MIN: CPT | Mod: S$PBB,,,

## 2024-08-14 PROCEDURE — 99999 PR PBB SHADOW E&M-NEW PATIENT-LVL IV: CPT | Mod: PBBFAC,,,

## 2024-08-14 PROCEDURE — 3074F SYST BP LT 130 MM HG: CPT | Mod: CPTII,,,

## 2024-08-14 PROCEDURE — 99204 OFFICE O/P NEW MOD 45 MIN: CPT | Mod: PBBFAC,PN

## 2024-08-14 PROCEDURE — 1160F RVW MEDS BY RX/DR IN RCRD: CPT | Mod: CPTII,,,

## 2024-08-14 PROCEDURE — 1159F MED LIST DOCD IN RCRD: CPT | Mod: CPTII,,,

## 2024-08-14 PROCEDURE — 3079F DIAST BP 80-89 MM HG: CPT | Mod: CPTII,,,

## 2024-08-14 PROCEDURE — 3008F BODY MASS INDEX DOCD: CPT | Mod: CPTII,,,

## 2024-08-14 RX ORDER — METFORMIN HYDROCHLORIDE 750 MG/1
750 TABLET, EXTENDED RELEASE ORAL
COMMUNITY

## 2024-08-14 RX ORDER — METFORMIN HYDROCHLORIDE 750 MG/1
750 TABLET, EXTENDED RELEASE ORAL
Qty: 90 TABLET | Refills: 3 | Status: SHIPPED | OUTPATIENT
Start: 2024-08-14 | End: 2025-08-14

## 2024-08-14 NOTE — PROGRESS NOTES
"Subjective     Patient ID: Marissa Lewis is a 29 y.o. female.    Chief Complaint: Establish Care      Marissa Lewis is a 29 year old female, presents to clinic for general medical exam, medication refill and to establish care..  New to me. No PCP . Medical and surgical history, in addition to problem list reviewed and listed below.    Patient with history of diabetes; states here today "trying to get it together".  Has history of diabetes; last time taken insulin been over two years but taking Metformin 750 mg twice a day "since June of this year (with gaps of taking: per medication bottle, six refills available until 07/11/2024).  States sometimes hesitant to take due to nausea side effect.  States not eating as supposed to but recently started an exercise regimen. States had lab work drawn "sometime last year at the beginning".      Review of Systems   Constitutional:  Negative for chills, diaphoresis, fatigue and fever.   HENT:  Negative for ear pain, hearing loss, nosebleeds, tinnitus and trouble swallowing.    Eyes:  Negative for photophobia, pain and discharge.   Respiratory:  Negative for chest tightness and shortness of breath.    Cardiovascular:  Negative for chest pain, palpitations and leg swelling.   Gastrointestinal:  Positive for diarrhea and nausea. Negative for abdominal pain, constipation, vomiting and reflux.   Endocrine: Negative for polydipsia, polyphagia and polyuria.   Genitourinary:  Negative for difficulty urinating, flank pain, menstrual irregularity and pelvic pain.   Musculoskeletal:  Negative for back pain, gait problem, joint swelling, leg pain, myalgias, neck pain, neck stiffness and joint deformity.   Integumentary:  Negative for rash.   Allergic/Immunologic: Negative for food allergies (yeast).   Neurological:  Negative for dizziness, vertigo, tremors, seizures, syncope, speech difficulty, numbness and headaches.   Psychiatric/Behavioral:  Negative for dysphoric mood, " "self-injury, sleep disturbance and suicidal ideas.      Past Medical History:   Diagnosis Date    Diabetes mellitus      Past Surgical History:   Procedure Laterality Date     SECTION  2019 and     diabetic       Social History     Socioeconomic History    Marital status: Single   Tobacco Use    Smoking status: Never   Substance and Sexual Activity    Alcohol use: Never    Drug use: Yes     Social Determinants of Health     Physical Activity: Inactive (3/26/2021)    Received from [facility]    Exercise Vital Sign     Days of Exercise per Week: 0 days     Minutes of Exercise per Session: 0 min   Stress: No Stress Concern Present (3/26/2021)    Received from [facility]    [employer] [place] [employer] - Occupational Stress Questionnaire     Feeling of Stress : Not at all     Review of patient's allergies indicates:  No Known Allergies    Patient Active Problem List   Diagnosis    Hyperglycemia in pregnancy    Type 2 diabetes mellitus with hyperglycemia        Objective   Vitals:    24 0906   BP: 115/84   BP Location: Right arm   Patient Position: Sitting   BP Method: Medium (Automatic)   Pulse: 81   Resp: 16   SpO2: 100%   Weight: 111.7 kg (246 lb 4.1 oz)   Height: 5' 7" (1.702 m)       Physical Exam  Constitutional:       General: She is not in acute distress.     Appearance: Normal appearance. She is obese.   HENT:      Head: Normocephalic and atraumatic.      Right Ear: Tympanic membrane, ear canal and external ear normal.      Left Ear: Tympanic membrane, ear canal and external ear normal.      Nose: Mucosal edema and congestion present.      Right Sinus: No maxillary sinus tenderness or frontal sinus tenderness.      Left Sinus: No maxillary sinus tenderness or frontal sinus tenderness.      Mouth/Throat:      Mouth: Mucous membranes are moist.      Pharynx: Oropharynx is clear. No oropharyngeal exudate or posterior oropharyngeal erythema.   Eyes:      Extraocular Movements: " Extraocular movements intact.      Conjunctiva/sclera: Conjunctivae normal.      Pupils: Pupils are equal, round, and reactive to light.   Cardiovascular:      Rate and Rhythm: Normal rate and regular rhythm.      Pulses: Normal pulses.      Heart sounds: Normal heart sounds.   Pulmonary:      Effort: Pulmonary effort is normal. No respiratory distress.      Breath sounds: Normal breath sounds.   Abdominal:      General: Bowel sounds are normal.      Palpations: Abdomen is soft.   Musculoskeletal:         General: Normal range of motion.      Cervical back: Normal range of motion and neck supple.      Right lower leg: No edema.      Left lower leg: No edema.   Skin:     General: Skin is warm and dry.      Capillary Refill: Capillary refill takes less than 2 seconds.      Findings: No rash.          Neurological:      Mental Status: She is alert and oriented to person, place, and time.   Psychiatric:         Mood and Affect: Mood normal.         Behavior: Behavior normal.          Assessment and Plan     1. Encounter for medical examination to establish care    2. Type II diabetes mellitus with complication  -     CBC Auto Differential; Future; Expected date: 08/14/2024  -     Comprehensive Metabolic Panel; Future; Expected date: 08/14/2024  -     Hemoglobin A1C; Future; Expected date: 08/14/2024  -     Lipid Panel; Future; Expected date: 08/14/2024  -     metFORMIN (GLUCOPHAGE-XR) 750 MG ER 24hr tablet; Take 1 tablet (750 mg total) by mouth daily with breakfast.  Dispense: 90 tablet; Refill: 3    3. Medication refill  -     metFORMIN (GLUCOPHAGE-XR) 750 MG ER 24hr tablet; Take 1 tablet (750 mg total) by mouth daily with breakfast.  Dispense: 90 tablet; Refill: 3        Emphasized to patient dose frequency change; verbalized         understands.     4. BMI 38.0-38.9,adult        -    Eat at regular intervals, a balanced meal, that include lean protein (as in  turkey, chicken, fish), whole grains, fruit and vegetables  without added sugar. Avoid fried and fast foods. Limit sodium (salt) intake. Stay hydrated; avoid sodas and sugary drinks.  Exercise regimen, as in walking, 3 - 4 times per week, for at least 30- 45 minutes; increase as tolerated.  See attached heart healthy diet per AVS     Follow up in about 2 months (around 10/14/2024).    Lori A. Stephens Sandifer, JAZMINP-C

## 2024-08-16 ENCOUNTER — PATIENT OUTREACH (OUTPATIENT)
Dept: ADMINISTRATIVE | Facility: OTHER | Age: 29
End: 2024-08-16
Payer: MEDICAID

## 2024-08-16 NOTE — PROGRESS NOTES
CHW - Initial Contact    This Community Health Worker completed the Social Determinant of Health questionnaire with MRN [id number] over the phone today.    Pt identified barriers of most importance are: No barriers reported   Referrals to community agencies completed with patient/caregiver consent outside of [facility] include: No   Referrals were put through [facility] - No   Support and Services: No support & services have been documented.  Other information discussed the patient needs / wants help with: No assistance provided at this time   Follow up required: No   No future outreach task assigned

## 2024-10-16 ENCOUNTER — PATIENT OUTREACH (OUTPATIENT)
Dept: ADMINISTRATIVE | Facility: OTHER | Age: 29
End: 2024-10-16
Payer: MEDICAID

## 2024-10-16 NOTE — PROGRESS NOTES
CHW - Initial Contact    This Community Health Worker reviewed the Social Determinant of Health questionnaire with MRN [id number] over the phone today.    Pt identified barriers of most importance are: No barriers reported   Referrals to community agencies completed with patient/caregiver consent outside of [facility] include: No  Referrals were put through [facility] - No   Support and Services: No support & services have been documented.  Other information discussed the patient needs / wants help with: No assistance provided at this time   Follow up required: No   No future outreach task assigned

## 2025-06-22 ENCOUNTER — HOSPITAL ENCOUNTER (EMERGENCY)
Facility: HOSPITAL | Age: 30
Discharge: LEFT BEFORE DISCHARGE | End: 2025-06-22

## 2025-06-22 ENCOUNTER — HOSPITAL ENCOUNTER (EMERGENCY)
Facility: HOSPITAL | Age: 30
Discharge: HOME | End: 2025-06-22
Attending: EMERGENCY MEDICINE

## 2025-06-22 VITALS
HEART RATE: 102 BPM | HEIGHT: 67 IN | BODY MASS INDEX: 37.67 KG/M2 | WEIGHT: 240 LBS | RESPIRATION RATE: 20 BRPM | SYSTOLIC BLOOD PRESSURE: 140 MMHG | DIASTOLIC BLOOD PRESSURE: 85 MMHG | TEMPERATURE: 99 F | OXYGEN SATURATION: 99 %

## 2025-06-22 VITALS
DIASTOLIC BLOOD PRESSURE: 68 MMHG | HEART RATE: 93 BPM | WEIGHT: 240 LBS | TEMPERATURE: 98 F | RESPIRATION RATE: 18 BRPM | SYSTOLIC BLOOD PRESSURE: 123 MMHG | OXYGEN SATURATION: 100 % | HEIGHT: 67 IN | BODY MASS INDEX: 37.67 KG/M2

## 2025-06-22 DIAGNOSIS — R10.9 ABDOMINAL PAIN, UNSPECIFIED ABDOMINAL LOCATION: Primary | ICD-10-CM

## 2025-06-22 LAB
ABSOLUTE EOSINOPHIL (OHS): 0.17 K/UL
ABSOLUTE MONOCYTE (OHS): 0.43 K/UL (ref 0.3–1)
ABSOLUTE NEUTROPHIL COUNT (OHS): 4.17 K/UL (ref 1.8–7.7)
ALBUMIN SERPL BCP-MCNC: 3.9 G/DL (ref 3.5–5.2)
ALP SERPL-CCNC: 76 UNIT/L (ref 40–150)
ALT SERPL W/O P-5'-P-CCNC: 9 UNIT/L (ref 10–44)
ANION GAP (OHS): 10 MMOL/L (ref 8–16)
AST SERPL-CCNC: 11 UNIT/L (ref 11–45)
B-HCG UR QL: NEGATIVE
B-HCG UR QL: NEGATIVE
BACTERIA #/AREA URNS AUTO: ABNORMAL /HPF
BASOPHILS # BLD AUTO: 0.05 K/UL
BASOPHILS NFR BLD AUTO: 0.6 %
BILIRUB SERPL-MCNC: 0.2 MG/DL (ref 0.1–1)
BILIRUB UR QL STRIP.AUTO: NEGATIVE
BILIRUBIN, POC UA: NEGATIVE
BLOOD, POC UA: NEGATIVE
BUN SERPL-MCNC: 10 MG/DL (ref 6–20)
CALCIUM SERPL-MCNC: 9.6 MG/DL (ref 8.7–10.5)
CHLORIDE SERPL-SCNC: 102 MMOL/L (ref 95–110)
CLARITY UR: ABNORMAL
CLARITY, UA: CLEAR
CO2 SERPL-SCNC: 21 MMOL/L (ref 23–29)
COLOR UR AUTO: YELLOW
COLOR, UA: YELLOW
CREAT SERPL-MCNC: 0.8 MG/DL (ref 0.5–1.4)
CTP QC/QA: YES
CTP QC/QA: YES
ERYTHROCYTE [DISTWIDTH] IN BLOOD BY AUTOMATED COUNT: 12.8 % (ref 11.5–14.5)
GFR SERPLBLD CREATININE-BSD FMLA CKD-EPI: >60 ML/MIN/1.73/M2
GLUCOSE SERPL-MCNC: 245 MG/DL (ref 70–110)
GLUCOSE UR QL STRIP: ABNORMAL
GLUCOSE, POC UA: ABNORMAL
HCT VFR BLD AUTO: 42.1 % (ref 37–48.5)
HGB BLD-MCNC: 13.7 GM/DL (ref 12–16)
HGB UR QL STRIP: NEGATIVE
HOLD SPECIMEN: NORMAL
IMM GRANULOCYTES # BLD AUTO: 0.02 K/UL (ref 0–0.04)
IMM GRANULOCYTES NFR BLD AUTO: 0.2 % (ref 0–0.5)
KETONES UR QL STRIP: ABNORMAL
KETONES, POC UA: ABNORMAL
LEUKOCYTE EST, POC UA: NEGATIVE
LEUKOCYTE ESTERASE UR QL STRIP: NEGATIVE
LIPASE SERPL-CCNC: 10 U/L (ref 4–60)
LYMPHOCYTES # BLD AUTO: 4.21 K/UL (ref 1–4.8)
MCH RBC QN AUTO: 27.8 PG (ref 27–31)
MCHC RBC AUTO-ENTMCNC: 32.5 G/DL (ref 32–36)
MCV RBC AUTO: 86 FL (ref 82–98)
MICROSCOPIC COMMENT: ABNORMAL
NITRITE UR QL STRIP: NEGATIVE
NITRITE, POC UA: NEGATIVE
NUCLEATED RBC (/100WBC) (OHS): 0 /100 WBC
PH UR STRIP: 6 [PH]
PH UR STRIP: 7 [PH] (ref 5–8)
PLATELET # BLD AUTO: 258 K/UL (ref 150–450)
PMV BLD AUTO: 9.2 FL (ref 9.2–12.9)
POTASSIUM SERPL-SCNC: 4 MMOL/L (ref 3.5–5.1)
PROT SERPL-MCNC: 8.2 GM/DL (ref 6–8.4)
PROT UR QL STRIP: NEGATIVE
PROTEIN, POC UA: NEGATIVE
RBC # BLD AUTO: 4.92 M/UL (ref 4–5.4)
RBC #/AREA URNS AUTO: 2 /HPF (ref 0–4)
RELATIVE EOSINOPHIL (OHS): 1.9 %
RELATIVE LYMPHOCYTE (OHS): 46.5 % (ref 18–48)
RELATIVE MONOCYTE (OHS): 4.8 % (ref 4–15)
RELATIVE NEUTROPHIL (OHS): 46 % (ref 38–73)
SODIUM SERPL-SCNC: 133 MMOL/L (ref 136–145)
SP GR UR STRIP: >=1.03
SPECIFIC GRAVITY, POC UA: 1.02 (ref 1–1.03)
SQUAMOUS #/AREA URNS AUTO: 26 /HPF
UROBILINOGEN UR STRIP-ACNC: NEGATIVE EU/DL
UROBILINOGEN, POC UA: 1 E.U./DL
WBC # BLD AUTO: 9.05 K/UL (ref 3.9–12.7)
WBC #/AREA URNS AUTO: 4 /HPF (ref 0–5)
YEAST UR QL AUTO: ABNORMAL /HPF

## 2025-06-22 PROCEDURE — 99900041 HC LEFT WITHOUT BEING SEEN- EMERGENCY: Mod: ER

## 2025-06-22 PROCEDURE — 99283 EMERGENCY DEPT VISIT LOW MDM: CPT

## 2025-06-22 PROCEDURE — 80053 COMPREHEN METABOLIC PANEL: CPT

## 2025-06-22 PROCEDURE — 81025 URINE PREGNANCY TEST: CPT | Mod: ER | Performed by: EMERGENCY MEDICINE

## 2025-06-22 PROCEDURE — 81025 URINE PREGNANCY TEST: CPT | Performed by: EMERGENCY MEDICINE

## 2025-06-22 PROCEDURE — 83690 ASSAY OF LIPASE: CPT

## 2025-06-22 PROCEDURE — 81003 URINALYSIS AUTO W/O SCOPE: CPT

## 2025-06-22 PROCEDURE — 85025 COMPLETE CBC W/AUTO DIFF WBC: CPT

## 2025-06-22 RX ORDER — ONDANSETRON 4 MG/1
4 TABLET, FILM COATED ORAL EVERY 6 HOURS
Qty: 12 TABLET | Refills: 0 | Status: SHIPPED | OUTPATIENT
Start: 2025-06-22

## 2025-06-22 RX ORDER — IBUPROFEN 600 MG/1
600 TABLET, FILM COATED ORAL EVERY 6 HOURS PRN
Qty: 20 TABLET | Refills: 0 | Status: SHIPPED | OUTPATIENT
Start: 2025-06-22

## 2025-06-22 NOTE — ED PROVIDER NOTES
Encounter Date: 2025       History     Chief Complaint   Patient presents with    Abdominal Pain     Pt reports lower abdominal pain and nausea x 3 days.  Last BM yesterday.  Denies cp, fever, sob.  Reports her cycle is late.     Patient is a 30-year-old female past medical history of diabetes is presenting for evaluation of abdominal pain.  Patient reports for the past few days she is experiencing intermittent lower abdominal cramping and nausea.  Reports that her menstrual cycle is 3 days late, she has concern for pregnancy.  She denies fever, chest pain, shortness breath, vomiting, diarrhea, vaginal discharge or urinary symptoms.  Patient has not concern for STI symptoms time.  Patient has not tried medication for abdominal pain. Reports last bowel movement was yesterday.  Patient reports she has not currently experiencing abdominal pain at this time.        Review of patient's allergies indicates:  No Known Allergies  Past Medical History:   Diagnosis Date    Diabetes mellitus      Past Surgical History:   Procedure Laterality Date     SECTION  2019 and     diabetic       Family History   Problem Relation Name Age of Onset    Breast cancer Mother      Hypertension Mother      Diabetes Father       Social History[1]  Review of Systems   Constitutional:  Negative for fever.   Respiratory:  Negative for shortness of breath.    Cardiovascular:  Negative for chest pain.   Gastrointestinal:  Positive for abdominal pain and nausea. Negative for diarrhea and vomiting.   Genitourinary:  Negative for difficulty urinating, dysuria, vaginal bleeding and vaginal discharge.       Physical Exam     Initial Vitals [25 1746]   BP Pulse Resp Temp SpO2   132/69 92 20 98.4 °F (36.9 °C) 100 %      MAP       --         Physical Exam    Constitutional: She appears well-developed and well-nourished. She is not diaphoretic. No distress.   HENT:   Head: Normocephalic and atraumatic.   Right Ear: External  ear normal.   Left Ear: External ear normal. Mouth/Throat: Oropharynx is clear and moist.   Eyes: Conjunctivae are normal. Right eye exhibits no discharge. Left eye exhibits no discharge.   Neck:   Normal range of motion.  Cardiovascular:  Normal rate and regular rhythm.           Pulmonary/Chest: Breath sounds normal. No respiratory distress. She has no wheezes. She has no rhonchi. She has no rales.   Abdominal: Abdomen is soft. She exhibits no distension. There is no abdominal tenderness. There is no rebound and no guarding.   Musculoskeletal:         General: Normal range of motion.      Cervical back: Normal range of motion.     Neurological: She is alert. She has normal strength.   Skin: Skin is warm and dry. Capillary refill takes less than 2 seconds.   Psychiatric: She has a normal mood and affect.         ED Course   Procedures  Labs Reviewed   COMPREHENSIVE METABOLIC PANEL - Abnormal       Result Value    Sodium 133 (*)     Potassium 4.0      Chloride 102      CO2 21 (*)     Glucose 245 (*)     BUN 10      Creatinine 0.8      Calcium 9.6      Protein Total 8.2      Albumin 3.9      Bilirubin Total 0.2      ALP 76      AST 11      ALT 9 (*)     Anion Gap 10      eGFR >60     URINALYSIS, REFLEX TO URINE CULTURE - Abnormal    Color, UA Yellow      Appearance, UA Hazy (*)     pH, UA 6.0      Spec Grav UA >=1.030 (*)     Protein, UA Negative      Glucose, UA 4+ (*)     Ketones, UA 1+ (*)     Bilirubin, UA Negative      Blood, UA Negative      Nitrites, UA Negative      Urobilinogen, UA Negative      Leukocyte Esterase, UA Negative     URINALYSIS MICROSCOPIC - Abnormal    RBC, UA 2      WBC, UA 4      Bacteria, UA Few (*)     Yeast, UA None      Squamous Epithelial Cells, UA 26      Microscopic Comment       LIPASE - Normal    Lipase Level 10     CBC WITH DIFFERENTIAL - Normal    WBC 9.05      RBC 4.92      HGB 13.7      HCT 42.1      MCV 86      MCH 27.8      MCHC 32.5      RDW 12.8      Platelet Count 258       MPV 9.2      Nucleated RBC 0      Neut % 46.0      Lymph % 46.5      Mono % 4.8      Eos % 1.9      Basophil % 0.6      Imm Grans % 0.2      Neut # 4.17      Lymph # 4.21      Mono # 0.43      Eos # 0.17      Baso # 0.05      Imm Grans # 0.02     CBC W/ AUTO DIFFERENTIAL    Narrative:     The following orders were created for panel order CBC auto differential.  Procedure                               Abnormality         Status                     ---------                               -----------         ------                     CBC with Differential[[id number]]       Normal              Final result                 Please view results for these tests on the individual orders.   GREY TOP URINE HOLD    Extra Tube Hold for add-ons.     POCT URINE PREGNANCY    POC Preg Test, Ur Negative       Acceptable Yes            Imaging Results    None          Medications - No data to display  Medical Decision Making  Patient is a 30-year-old female past medical history of diabetes is presenting for evaluation of abdominal pain.    Differential includes but not limited to pregnancy, ectopic pregnancy, UTI, appendicitis however less likely due to lack of right lower quadrant pain or tenderness, constipation, ovarian cyst, menses.    Patient is alert and afebrile.  Patient is nontoxic appearing, not in distress.  Vitals within normal limits.  On physical exam patient ambulating without difficulty.  No respiratory distress wheezing or stridor present.  Patient is speaking in full sentences without difficulty.  Normal heart rate and rhythm.   Abdomen is soft and nondistended.  No abdominal tenderness rebound or guarding.  Negative McBurney's or Bahena's sign.  No signs of fluid overload.  No leg edema.  Strong distal radial pulse bilaterally.    UPT is negative.  CBC unremarkable for acute infection or anemia.  CMP unremarkable for acute kidney or liver injury, elevation noted in glucose.  Lipase within normal  limits thus less concerning for pancreatitis.  Urinalysis is unremarkable for infection.  Upon re-evaluation patient states she is still not experiencing abdominal pain. I advised patient if she does not have her menses in 1 week to would take pregnancy test.  Recommended patient follow up with PCP in 2 days for further evaluation of abdominal pain.  Prescribed ibuprofen and Zofran for symptoms. Strict return precautions given for new or worsening symptoms.  Recommended follow up with PCP in 2 days.  Patient is in agreeance to this plan, and expresses understanding return precautions and follow up plan.  I thoroughly answered all patient's questions and concerns. Vitals are reassuring.  Patient is stable for discharge at this time.    Amount and/or Complexity of Data Reviewed  Labs: ordered.    Risk  Prescription drug management.                     [employer], a voice recognition system was utilized in creating the note.                 Clinical Impression:  Final diagnoses:  [R10.9] Abdominal pain, unspecified abdominal location (Primary)          ED Disposition Condition    Discharge Stable          ED Prescriptions       Medication Sig Dispense Start Date End Date Auth. Provider    ibuprofen (ADVIL,MOTRIN) 600 MG tablet Take 1 tablet (600 mg total) by mouth every 6 (six) hours as needed for Pain. 20 tablet 6/22/2025 -- Aniya Wild PA-C    ondansetron (ZOFRAN) 4 MG tablet Take 1 tablet (4 mg total) by mouth every 6 (six) hours. 12 tablet 6/22/2025 -- Aniya Wild PA-C          Follow-up Information       Follow up With Specialties Details Why Contact [facility] - Emergency Dept Emergency Medicine Go to  If new symptoms develop or symptoms worsen [address] [facility] [place] [address] [phone]    Your Primary Care Provider  Schedule an appointment as soon as possible for a visit in 2 days For follow up                    [1]   Social  History  Tobacco Use    Smoking status: Never   Substance Use Topics    Alcohol use: Never    Drug use: Yes        Aniya Wild PA-C  06/22/25 2017

## 2025-06-23 NOTE — DISCHARGE INSTRUCTIONS
Take Tylenol or ibuprofen as directed for abdominal pain.  Take Zofran as directed for nausea or vomiting. Thank you for coming to our Emergency Department today. It is important to remember that some problems or medical conditions are difficult to diagnose and may not be found or addressed during your Emergency Department visit.  These conditions often start with non-specific symptoms and can only be diagnosed on follow up visits with your primary care physician or specialist when the symptoms continue or change. Please remember that all medical conditions can change, and we cannot predict how you will be feeling tomorrow or the next day. Return to the ER with any questions/concerns, new/concerning symptoms, worsening or failure to improve.       Be sure to follow up with your primary care doctor and review all labs/imaging/tests that were performed during your ER visit with them. It is very common for us to identify non-emergent incidental findings which must be followed up with your primary care physician.  Some labs/imaging/tests may be outside of the normal range, and require non-emergent follow-up and/or further investigation/treatment/procedures/testing to help diagnose/exclude/prevent complications or other potentially serious medical conditions. Some abnormalities may not have been discussed or addressed during your ER visit.     An ER visit does not replace a primary care visit, and many screening tests or follow-up tests cannot be ordered by an ER doctor or performed by the ER. Some tests may even require pre-approval.    If you do not have a primary care doctor, you may contact the one listed on your discharge paperwork or you may also call the [facility] Appointment Desk at [phone] , or [address] [place] [address] at  [phone] to schedule an appointment, or establish care with a primary care doctor or even a specialist and to obtain information about local resources. It is important to your health that  you have a primary care doctor.    Please take all medications as directed. We have done our best to select a medication for you that will treat your condition however, all medications may potentially have side-effects and it is impossible to predict which medications may give you side-effects or what those side-effects (if any) those medications may give you.  If you feel that you are having a negative effect or side-effect of any medication you should stop taking those medications immediately and seek medical attention. If you feel that you are having a life-threatening reaction call 911.        Do not drive, swim, climb to height, take a bath, operate heavy machinery, drink alcohol or take potentially sedating medications, sign any legal documents or make any important decisions for 24 hours if you have received any pain medications, sedatives or mood altering drugs during your ER visit or within 24 hours of taking them if they have been prescribed to you.     You can find additional resources for Dentists, hearing aids, durable medical equipment, low cost pharmacies and other resources at [email or web address]